# Patient Record
Sex: FEMALE | Race: WHITE | NOT HISPANIC OR LATINO | URBAN - METROPOLITAN AREA
[De-identification: names, ages, dates, MRNs, and addresses within clinical notes are randomized per-mention and may not be internally consistent; named-entity substitution may affect disease eponyms.]

---

## 2021-01-06 ENCOUNTER — OFFICE VISIT (OUTPATIENT)
Dept: DERMATOLOGY | Facility: CLINIC | Age: 76
End: 2021-01-06
Payer: MEDICARE

## 2021-01-06 DIAGNOSIS — L57.0 AK (ACTINIC KERATOSIS): ICD-10-CM

## 2021-01-06 DIAGNOSIS — D18.01 CHERRY ANGIOMA: ICD-10-CM

## 2021-01-06 DIAGNOSIS — Z12.83 SCREENING EXAM FOR SKIN CANCER: Primary | ICD-10-CM

## 2021-01-06 DIAGNOSIS — L82.1 SK (SEBORRHEIC KERATOSIS): ICD-10-CM

## 2021-01-06 DIAGNOSIS — L81.4 SOLAR LENTIGO: ICD-10-CM

## 2021-01-06 DIAGNOSIS — D22.9 MULTIPLE BENIGN NEVI: ICD-10-CM

## 2021-01-06 PROCEDURE — 99999 PR PBB SHADOW E&M-NEW PATIENT-LVL II: ICD-10-PCS | Mod: PBBFAC,,, | Performed by: PATHOLOGY

## 2021-01-06 PROCEDURE — 17000 DESTRUCT PREMALG LESION: CPT | Mod: PBBFAC | Performed by: PATHOLOGY

## 2021-01-06 PROCEDURE — 17000 DESTRUCT PREMALG LESION: CPT | Mod: S$PBB,,, | Performed by: PATHOLOGY

## 2021-01-06 PROCEDURE — 99202 OFFICE O/P NEW SF 15 MIN: CPT | Mod: 25,S$PBB,, | Performed by: PATHOLOGY

## 2021-01-06 PROCEDURE — 99202 PR OFFICE/OUTPT VISIT, NEW, LEVL II, 15-29 MIN: ICD-10-PCS | Mod: 25,S$PBB,, | Performed by: PATHOLOGY

## 2021-01-06 PROCEDURE — 17000 PR DESTRUCTION(LASER SURGERY,CRYOSURGERY,CHEMOSURGERY),PREMALIGNANT LESIONS,FIRST LESION: ICD-10-PCS | Mod: S$PBB,,, | Performed by: PATHOLOGY

## 2021-01-06 PROCEDURE — 99999 PR PBB SHADOW E&M-NEW PATIENT-LVL II: CPT | Mod: PBBFAC,,, | Performed by: PATHOLOGY

## 2021-01-06 PROCEDURE — 99202 OFFICE O/P NEW SF 15 MIN: CPT | Mod: PBBFAC,25 | Performed by: PATHOLOGY

## 2021-01-07 ENCOUNTER — IMMUNIZATION (OUTPATIENT)
Dept: OBSTETRICS AND GYNECOLOGY | Facility: CLINIC | Age: 76
End: 2021-01-07
Payer: MEDICARE

## 2021-01-07 DIAGNOSIS — Z23 NEED FOR VACCINATION: ICD-10-CM

## 2021-01-07 PROCEDURE — 91300 COVID-19, MRNA, LNP-S, PF, 30 MCG/0.3 ML DOSE VACCINE: CPT | Mod: PBBFAC

## 2021-01-28 ENCOUNTER — IMMUNIZATION (OUTPATIENT)
Dept: OBSTETRICS AND GYNECOLOGY | Facility: CLINIC | Age: 76
End: 2021-01-28
Payer: MEDICARE

## 2021-01-28 DIAGNOSIS — Z23 NEED FOR VACCINATION: Primary | ICD-10-CM

## 2021-01-28 PROCEDURE — 0002A COVID-19, MRNA, LNP-S, PF, 30 MCG/0.3 ML DOSE VACCINE: CPT | Mod: PBBFAC | Performed by: FAMILY MEDICINE

## 2021-01-28 PROCEDURE — 91300 COVID-19, MRNA, LNP-S, PF, 30 MCG/0.3 ML DOSE VACCINE: CPT | Mod: PBBFAC | Performed by: FAMILY MEDICINE

## 2021-09-28 ENCOUNTER — IMMUNIZATION (OUTPATIENT)
Dept: INTERNAL MEDICINE | Facility: CLINIC | Age: 76
End: 2021-09-28
Payer: MEDICARE

## 2021-09-28 DIAGNOSIS — Z23 NEED FOR VACCINATION: Primary | ICD-10-CM

## 2021-09-28 PROCEDURE — 0003A COVID-19, MRNA, LNP-S, PF, 30 MCG/0.3 ML DOSE VACCINE: CPT | Mod: PBBFAC

## 2021-09-28 PROCEDURE — 91300 COVID-19, MRNA, LNP-S, PF, 30 MCG/0.3 ML DOSE VACCINE: CPT | Mod: PBBFAC

## 2022-11-23 ENCOUNTER — OFFICE VISIT (OUTPATIENT)
Dept: URGENT CARE | Facility: CLINIC | Age: 77
End: 2022-11-23
Payer: MEDICARE

## 2022-11-23 VITALS
RESPIRATION RATE: 20 BRPM | TEMPERATURE: 98 F | DIASTOLIC BLOOD PRESSURE: 94 MMHG | SYSTOLIC BLOOD PRESSURE: 152 MMHG | OXYGEN SATURATION: 98 % | HEART RATE: 78 BPM

## 2022-11-23 DIAGNOSIS — S61.209A AVULSION OF SKIN OF FINGER, INITIAL ENCOUNTER: Primary | ICD-10-CM

## 2022-11-23 PROCEDURE — 12041 LACERATION REPAIR: ICD-10-PCS | Mod: S$GLB,,, | Performed by: NURSE PRACTITIONER

## 2022-11-23 PROCEDURE — 99203 OFFICE O/P NEW LOW 30 MIN: CPT | Mod: 25,S$GLB,, | Performed by: NURSE PRACTITIONER

## 2022-11-23 PROCEDURE — 90715 TDAP VACCINE GREATER THAN OR EQUAL TO 7YO IM: ICD-10-PCS | Mod: AT,S$GLB,, | Performed by: FAMILY MEDICINE

## 2022-11-23 PROCEDURE — 90471 IMMUNIZATION ADMIN: CPT | Mod: AT,S$GLB,, | Performed by: FAMILY MEDICINE

## 2022-11-23 PROCEDURE — 90715 TDAP VACCINE 7 YRS/> IM: CPT | Mod: AT,S$GLB,, | Performed by: FAMILY MEDICINE

## 2022-11-23 PROCEDURE — 99203 PR OFFICE/OUTPT VISIT, NEW, LEVL III, 30-44 MIN: ICD-10-PCS | Mod: 25,S$GLB,, | Performed by: NURSE PRACTITIONER

## 2022-11-23 PROCEDURE — 12041 INTMD RPR N-HF/GENIT 2.5CM/<: CPT | Mod: S$GLB,,, | Performed by: NURSE PRACTITIONER

## 2022-11-23 PROCEDURE — 90471 TDAP VACCINE GREATER THAN OR EQUAL TO 7YO IM: ICD-10-PCS | Mod: AT,S$GLB,, | Performed by: FAMILY MEDICINE

## 2022-11-23 RX ORDER — IRBESARTAN 300 MG/1
TABLET ORAL
COMMUNITY
Start: 2022-10-04 | End: 2023-01-04

## 2022-11-23 RX ORDER — NEOMYCIN SULFATE, POLYMYXIN B SULFATE AND DEXAMETHASONE 3.5; 10000; 1 MG/ML; [USP'U]/ML; MG/ML
1 SUSPENSION/ DROPS OPHTHALMIC 3 TIMES DAILY
COMMUNITY
Start: 2022-10-04 | End: 2023-01-04

## 2022-11-23 RX ORDER — CHLORTHALIDONE 25 MG/1
TABLET ORAL
COMMUNITY
Start: 2022-10-04 | End: 2023-01-04

## 2022-11-23 RX ORDER — PRAVASTATIN SODIUM 20 MG/1
TABLET ORAL
COMMUNITY
Start: 2022-10-04 | End: 2023-03-08

## 2022-11-23 RX ORDER — NIRMATRELVIR AND RITONAVIR 300-100 MG
KIT ORAL
COMMUNITY
Start: 2022-10-04 | End: 2023-01-04

## 2022-11-23 RX ORDER — PANTOPRAZOLE SODIUM 20 MG/1
TABLET, DELAYED RELEASE ORAL
COMMUNITY
Start: 2022-10-04 | End: 2023-01-04

## 2022-11-23 NOTE — PROGRESS NOTES
Subjective:       Patient ID: Maya Santos is a 77 y.o. female.    Vitals:  temperature is 98.2 °F (36.8 °C). Her blood pressure is 152/94 (abnormal) and her pulse is 78. Her respiration is 20 and oxygen saturation is 98%.     Chief Complaint: Laceration    Pt presents with complaint of laceration to left second digit.  Pt states incident occurred about 3 hours ago when she sliced her finger in the kitchen.  Pt states she is unsure when her last tetanus was.  Pt states she washed the area, sprayed with alcohol and applied neosporin to the area.  Provider note begins below    Last Tdap in 2015.  Patient was cutting squash with a Mandolin    Laceration   The incident occurred 1 to 3 hours ago. The laceration is located on the Right hand. The laceration is 2 cm in size. The laceration mechanism was a dirty knife. The pain is at a severity of 4/10. The pain is moderate. The pain has been Fluctuating since onset. She reports no foreign bodies present. Her tetanus status is out of date.     Constitution: Negative for fatigue and fever.   Cardiovascular:  Negative for chest pain and sob on exertion.   Respiratory:  Negative for cough and shortness of breath.    Gastrointestinal:  Negative for nausea, vomiting and constipation.   Skin:  Positive for laceration and erythema.     Objective:      Physical Exam   Constitutional: She is oriented to person, place, and time.   HENT:   Head: Normocephalic and atraumatic.   Cardiovascular: Normal rate.   Pulmonary/Chest: Effort normal. No respiratory distress.   Abdominal: Normal appearance.   Musculoskeletal:      Left hand: She exhibits laceration.   Neurological: She is alert and oriented to person, place, and time.   Skin: Skin is warm and dry. Lacerations - upper ext.:  left handerythema   Psychiatric: Her behavior is normal. Mood normal.             Assessment:       1. Avulsion of skin of finger, initial encounter          Plan:       Skin glue and pressure dressing  applied.  Leave pressure dressing in place for 2 days.  At which point may remove pressure dressing and leave open air.  Do not apply ointments.  May reapply pressure dressing if begins bleeding again        Avulsion of skin of finger, initial encounter

## 2022-11-23 NOTE — PROCEDURES
Laceration Repair    Date/Time: 2022 1:45 PM  Performed by: Elif Olivares NP  Authorized by: Elif Olivares NP   Consent Done: Yes  Consent: Verbal consent obtained.  Risks and benefits: risks, benefits and alternatives were discussed  Consent given by: patient  Patient identity confirmed: , name and verbally with patient  Body area: upper extremity  Location details: left index finger  Laceration length: 0.2 cm  Foreign bodies: no foreign bodies  Tendon involvement: none  Nerve involvement: none  Vascular damage: no    Patient sedated: no  Preparation: Patient was prepped and draped in the usual sterile fashion.  Irrigation solution: saline  Irrigation method: syringe  Amount of cleaning: standard  Debridement: minimal  Degree of undermining: none  Skin closure: glue  Dressing: non-stick sterile dressing and pressure/compression dressing  Patient tolerance: Patient tolerated the procedure well with no immediate complications

## 2022-11-23 NOTE — PROGRESS NOTES
Subjective:       Patient ID: Maya Santos is a 77 y.o. female.    Vitals:  temperature is 98.2 °F (36.8 °C). Her blood pressure is 152/94 (abnormal) and her pulse is 78. Her respiration is 20 and oxygen saturation is 98%.     Chief Complaint: Laceration    Pt presents with complaint of laceration to left second digit.  Pt states incident occurred about 3 hours ago when she sliced her finger in the kitchen.  Pt states she is unsure when her last tetanus was.  Pt states she washed the area, sprayed with alcohol and applied neosporin to the area.  Provider note begins below    Last Tdap in 2015.    Laceration   The incident occurred 1 to 3 hours ago. The laceration is located on the Right hand. The laceration is 2 cm in size. The laceration mechanism was a dirty knife. The pain is at a severity of 4/10. The pain is moderate. The pain has been Fluctuating since onset. She reports no foreign bodies present. Her tetanus status is out of date.     Constitution: Negative for fatigue and fever.   Cardiovascular:  Negative for chest pain and sob on exertion.   Respiratory:  Negative for cough and shortness of breath.    Gastrointestinal:  Negative for nausea, vomiting and constipation.   Skin:  Positive for laceration and erythema.     Objective:      Physical Exam   Constitutional: She is oriented to person, place, and time.   HENT:   Head: Normocephalic and atraumatic.   Cardiovascular: Normal rate.   Pulmonary/Chest: Effort normal. No respiratory distress.   Abdominal: Normal appearance.   Musculoskeletal:      Left hand: She exhibits laceration.   Neurological: She is alert and oriented to person, place, and time.   Skin: Skin is warm and dry. Lacerations - upper ext.:  left handerythema   Psychiatric: Her behavior is normal. Mood normal.             Assessment:       1. Avulsion of skin of finger, initial encounter          Plan:       Skin glue and pressure dressing applied.  Leave pressure dressing in place for 2  days.  At which point may remove pressure dressing and leave open air.  Do not apply ointments.  May reapply pressure dressing if begins bleeding again        Avulsion of skin of finger, initial encounter

## 2022-11-29 RX ORDER — CEPHALEXIN 500 MG/1
500 CAPSULE ORAL EVERY 8 HOURS
Qty: 21 CAPSULE | Refills: 0 | Status: SHIPPED | OUTPATIENT
Start: 2022-11-29 | End: 2023-01-04

## 2023-01-04 ENCOUNTER — CLINICAL SUPPORT (OUTPATIENT)
Dept: INTERNAL MEDICINE | Facility: CLINIC | Age: 78
End: 2023-01-04
Payer: MEDICARE

## 2023-01-04 ENCOUNTER — OFFICE VISIT (OUTPATIENT)
Dept: INTERNAL MEDICINE | Facility: CLINIC | Age: 78
End: 2023-01-04
Payer: MEDICARE

## 2023-01-04 VITALS
BODY MASS INDEX: 32.42 KG/M2 | SYSTOLIC BLOOD PRESSURE: 120 MMHG | WEIGHT: 201.75 LBS | HEIGHT: 66 IN | HEART RATE: 58 BPM | DIASTOLIC BLOOD PRESSURE: 80 MMHG

## 2023-01-04 DIAGNOSIS — R73.03 PREDIABETES: ICD-10-CM

## 2023-01-04 DIAGNOSIS — I10 BENIGN ESSENTIAL HYPERTENSION: ICD-10-CM

## 2023-01-04 DIAGNOSIS — Z78.0 ASYMPTOMATIC MENOPAUSE: ICD-10-CM

## 2023-01-04 DIAGNOSIS — R79.9 ABNORMAL FINDING OF BLOOD CHEMISTRY, UNSPECIFIED: ICD-10-CM

## 2023-01-04 DIAGNOSIS — I44.7 LBBB (LEFT BUNDLE BRANCH BLOCK): ICD-10-CM

## 2023-01-04 DIAGNOSIS — Z00.00 ANNUAL PHYSICAL EXAM: Primary | ICD-10-CM

## 2023-01-04 DIAGNOSIS — Z12.31 SCREENING MAMMOGRAM FOR BREAST CANCER: ICD-10-CM

## 2023-01-04 DIAGNOSIS — E78.5 DYSLIPIDEMIA: ICD-10-CM

## 2023-01-04 DIAGNOSIS — E04.2 MULTINODULAR GOITER: Primary | ICD-10-CM

## 2023-01-04 PROBLEM — I34.0 MITRAL VALVE INSUFFICIENCY: Status: ACTIVE | Noted: 2023-01-04

## 2023-01-04 PROBLEM — M85.80 SENILE OSTEOPENIA: Status: ACTIVE | Noted: 2022-09-27

## 2023-01-04 PROBLEM — E55.9 VITAMIN D DEFICIENCY: Status: ACTIVE | Noted: 2022-09-27

## 2023-01-04 PROBLEM — R01.1 HEART MURMUR: Status: ACTIVE | Noted: 2022-09-27

## 2023-01-04 PROBLEM — E78.00 PRIMARY HYPERCHOLESTEROLEMIA: Status: ACTIVE | Noted: 2022-05-11

## 2023-01-04 LAB
ALBUMIN SERPL BCP-MCNC: 4.2 G/DL (ref 3.5–5.2)
ALP SERPL-CCNC: 74 U/L (ref 55–135)
ALT SERPL W/O P-5'-P-CCNC: 25 U/L (ref 10–44)
ANION GAP SERPL CALC-SCNC: 9 MMOL/L (ref 8–16)
AST SERPL-CCNC: 24 U/L (ref 10–40)
BILIRUB SERPL-MCNC: 1 MG/DL (ref 0.1–1)
BILIRUB UR QL STRIP: NEGATIVE
BUN SERPL-MCNC: 21 MG/DL (ref 8–23)
CALCIUM SERPL-MCNC: 9.5 MG/DL (ref 8.7–10.5)
CHLORIDE SERPL-SCNC: 106 MMOL/L (ref 95–110)
CHOLEST SERPL-MCNC: 179 MG/DL (ref 120–199)
CHOLEST/HDLC SERPL: 4.8 {RATIO} (ref 2–5)
CLARITY UR REFRACT.AUTO: CLEAR
CO2 SERPL-SCNC: 28 MMOL/L (ref 23–29)
COLOR UR AUTO: YELLOW
CREAT SERPL-MCNC: 0.9 MG/DL (ref 0.5–1.4)
ERYTHROCYTE [DISTWIDTH] IN BLOOD BY AUTOMATED COUNT: 12.7 % (ref 11.5–14.5)
EST. GFR  (NO RACE VARIABLE): >60 ML/MIN/1.73 M^2
ESTIMATED AVG GLUCOSE: 108 MG/DL (ref 68–131)
GLUCOSE SERPL-MCNC: 90 MG/DL (ref 70–110)
GLUCOSE UR QL STRIP: NEGATIVE
HBA1C MFR BLD: 5.4 % (ref 4–5.6)
HCT VFR BLD AUTO: 42.8 % (ref 37–48.5)
HCV AB SERPL QL IA: NORMAL
HDLC SERPL-MCNC: 37 MG/DL (ref 40–75)
HDLC SERPL: 20.7 % (ref 20–50)
HGB BLD-MCNC: 13.9 G/DL (ref 12–16)
HGB UR QL STRIP: NEGATIVE
KETONES UR QL STRIP: NEGATIVE
LDLC SERPL CALC-MCNC: 122.4 MG/DL (ref 63–159)
LEUKOCYTE ESTERASE UR QL STRIP: NEGATIVE
MCH RBC QN AUTO: 29.5 PG (ref 27–31)
MCHC RBC AUTO-ENTMCNC: 32.5 G/DL (ref 32–36)
MCV RBC AUTO: 91 FL (ref 82–98)
MICROSCOPIC COMMENT: NORMAL
NITRITE UR QL STRIP: NEGATIVE
NONHDLC SERPL-MCNC: 142 MG/DL
PH UR STRIP: 7 [PH] (ref 5–8)
PLATELET # BLD AUTO: 255 K/UL (ref 150–450)
PMV BLD AUTO: 11.1 FL (ref 9.2–12.9)
POTASSIUM SERPL-SCNC: 4.3 MMOL/L (ref 3.5–5.1)
PROT SERPL-MCNC: 7.4 G/DL (ref 6–8.4)
PROT UR QL STRIP: NEGATIVE
RBC # BLD AUTO: 4.71 M/UL (ref 4–5.4)
SODIUM SERPL-SCNC: 143 MMOL/L (ref 136–145)
SP GR UR STRIP: 1.02 (ref 1–1.03)
TRIGL SERPL-MCNC: 98 MG/DL (ref 30–150)
TSH SERPL DL<=0.005 MIU/L-ACNC: 2.68 UIU/ML (ref 0.4–4)
URN SPEC COLLECT METH UR: NORMAL
WBC # BLD AUTO: 7.43 K/UL (ref 3.9–12.7)

## 2023-01-04 PROCEDURE — 99213 OFFICE O/P EST LOW 20 MIN: CPT | Mod: PBBFAC | Performed by: INTERNAL MEDICINE

## 2023-01-04 PROCEDURE — 90750 HZV VACC RECOMBINANT IM: CPT | Mod: PBBFAC

## 2023-01-04 PROCEDURE — 80053 COMPREHEN METABOLIC PANEL: CPT | Performed by: INTERNAL MEDICINE

## 2023-01-04 PROCEDURE — 90471 IMMUNIZATION ADMIN: CPT | Mod: PBBFAC

## 2023-01-04 PROCEDURE — 81001 URINALYSIS AUTO W/SCOPE: CPT | Performed by: INTERNAL MEDICINE

## 2023-01-04 PROCEDURE — 86803 HEPATITIS C AB TEST: CPT | Performed by: INTERNAL MEDICINE

## 2023-01-04 PROCEDURE — 99999 PR PBB SHADOW E&M-EST. PATIENT-LVL III: ICD-10-PCS | Mod: PBBFAC,,, | Performed by: INTERNAL MEDICINE

## 2023-01-04 PROCEDURE — 84443 ASSAY THYROID STIM HORMONE: CPT | Performed by: INTERNAL MEDICINE

## 2023-01-04 PROCEDURE — 85027 COMPLETE CBC AUTOMATED: CPT | Performed by: INTERNAL MEDICINE

## 2023-01-04 PROCEDURE — 99214 PR OFFICE/OUTPT VISIT, EST, LEVL IV, 30-39 MIN: ICD-10-PCS | Mod: S$PBB,,, | Performed by: INTERNAL MEDICINE

## 2023-01-04 PROCEDURE — 99214 OFFICE O/P EST MOD 30 MIN: CPT | Mod: S$PBB,,, | Performed by: INTERNAL MEDICINE

## 2023-01-04 PROCEDURE — 83036 HEMOGLOBIN GLYCOSYLATED A1C: CPT | Performed by: INTERNAL MEDICINE

## 2023-01-04 PROCEDURE — 80061 LIPID PANEL: CPT | Performed by: INTERNAL MEDICINE

## 2023-01-04 PROCEDURE — 99999 PR PBB SHADOW E&M-EST. PATIENT-LVL III: CPT | Mod: PBBFAC,,, | Performed by: INTERNAL MEDICINE

## 2023-01-04 RX ORDER — SEMAGLUTIDE 1.34 MG/ML
0.25 INJECTION, SOLUTION SUBCUTANEOUS
Qty: 1 PEN | Refills: 5 | Status: SHIPPED | OUTPATIENT
Start: 2023-01-04 | End: 2023-01-04 | Stop reason: SDUPTHER

## 2023-01-04 RX ORDER — AMOXICILLIN 500 MG
CAPSULE ORAL DAILY
COMMUNITY

## 2023-01-04 RX ORDER — IRBESARTAN AND HYDROCHLOROTHIAZIDE 150; 12.5 MG/1; MG/1
1 TABLET, FILM COATED ORAL DAILY
Qty: 90 TABLET | Refills: 3 | Status: SHIPPED | OUTPATIENT
Start: 2023-01-04 | End: 2023-12-31

## 2023-01-04 RX ORDER — SEMAGLUTIDE 1.34 MG/ML
0.25 INJECTION, SOLUTION SUBCUTANEOUS
Qty: 1 PEN | Refills: 5 | Status: SHIPPED | OUTPATIENT
Start: 2023-01-04 | End: 2023-03-31

## 2023-01-08 NOTE — PROGRESS NOTES
"Subjective:       Patient ID: Maya Santos is a 77 y.o. female.    Chief Complaint: Establish [facility]    77-year-old nonsmoking female here to establish with [employer] [facility].  She has a past medical history significant for prediabetes with her last hemoglobin A1c in August being 5.8%.  She also has a history of essential hypertension, osteoarthritis of the left knee with a Baker's cyst in the past as well as osteopenia and thyroid nodules.  She also has chronic venous insufficiency and by looking back in her medical record does have a left bundle branch block and was following with cardiology for a "murmur"  Her biggest goal today is to lose the weight she gained during the COVID pandemic.  She eats very healthy and exercises with a  twice a week for at least an hour.    Review of Systems   Constitutional:  Positive for unexpected weight change. Negative for activity change, appetite change, chills, diaphoresis, fatigue and fever.   HENT:  Negative for nasal congestion, ear pain, mouth sores, postnasal drip, sinus pressure/congestion, sore throat and trouble swallowing.    Eyes:  Negative for pain, redness and visual disturbance.   Respiratory:  Negative for apnea, cough, chest tightness, shortness of breath and wheezing.    Cardiovascular:  Negative for chest pain, palpitations and leg swelling.   Gastrointestinal:  Negative for abdominal distention, abdominal pain, blood in stool, constipation, diarrhea, nausea and vomiting.   Endocrine: Negative for cold intolerance, polydipsia, polyphagia and polyuria.   Genitourinary:  Negative for difficulty urinating, dysuria, flank pain, frequency, hematuria, menstrual problem, pelvic pain and urgency.   Musculoskeletal:  Positive for arthralgias. Negative for back pain, joint swelling and neck pain.        Left knee   Integumentary:  Negative for color change, rash and wound.   Neurological:  Negative for dizziness, tremors, seizures, " syncope, weakness, light-headedness, numbness and headaches.   Hematological:  Negative for adenopathy. Does not bruise/bleed easily.   Psychiatric/Behavioral:  Negative for confusion, decreased concentration, dysphoric mood, hallucinations, self-injury, sleep disturbance and suicidal ideas. The patient is not nervous/anxious.        Objective:      Physical Exam  Constitutional:       Appearance: Normal appearance.   Eyes:      General: No scleral icterus.  Neck:      Vascular: No carotid bruit.   Cardiovascular:      Rate and Rhythm: Normal rate and regular rhythm.      Heart sounds: No murmur heard.  Pulmonary:      Effort: Pulmonary effort is normal.      Breath sounds: Normal breath sounds. No rales.   Abdominal:      General: Bowel sounds are normal. There is no distension.      Palpations: Abdomen is soft.      Tenderness: There is no abdominal tenderness.   Musculoskeletal:         General: No tenderness.      Cervical back: Normal range of motion and neck supple.   Neurological:      Mental Status: She is alert and oriented to person, place, and time.   Psychiatric:         Mood and Affect: Mood normal.         Behavior: Behavior normal.         Thought Content: Thought content normal.         Judgment: Judgment normal.       Assessment/plan       Multinodular goiter  -     US Soft Tissue Head Neck Thyroid; Future    Prediabetes  -     semaglutide (OZEMPIC) 0.25 mg or 0.5 mg(2 mg/1.5 mL) pen injector; Inject 0.25 mg into the skin every 7 days.  Dispense: 1 pen; Refill: 5    Dyslipidemia   LDL at 122 on low dose pravastatin - will likely increase on follow up     Benign essential hypertension   Change as needed [place] for fluid to daily HCTZ 12.5 within your angiotensin receptor blocker.  -     irbesartan-hydrochlorothiazide (AVALIDE) 150-12.5 mg per tablet; Take 1 tablet by mouth once daily.  Dispense: 90 tablet; Refill: 3    LBBB (left bundle branch block)- mitral valve regurg  Comments:  will get  records from Dr Mera     Asymptomatic menopause- with osteopenia on last BMD  -     DXA Bone Density Spine And Hip; Future    Screening mammogram for breast cancer  -     Mammo Digital Screening Bilat; Future; Expected date: 01/04/2024    Other orders  -     (In Office Administered) Zoster Recombinant Vaccine     Colonoscopy record

## 2023-02-06 ENCOUNTER — HOSPITAL ENCOUNTER (OUTPATIENT)
Dept: RADIOLOGY | Facility: OTHER | Age: 78
Discharge: HOME | End: 2023-02-06
Attending: INTERNAL MEDICINE
Payer: MEDICARE

## 2023-02-06 DIAGNOSIS — E04.2 MULTINODULAR GOITER: ICD-10-CM

## 2023-02-06 DIAGNOSIS — Z78.0 ASYMPTOMATIC MENOPAUSE: ICD-10-CM

## 2023-02-06 PROCEDURE — 76536 US EXAM OF HEAD AND NECK: CPT | Mod: TC

## 2023-02-06 PROCEDURE — 76536 US SOFT TISSUE HEAD NECK THYROID: ICD-10-PCS | Mod: 26,,, | Performed by: RADIOLOGY

## 2023-02-06 PROCEDURE — 77080 DEXA BONE DENSITY SPINE HIP: ICD-10-PCS | Mod: 26,,, | Performed by: RADIOLOGY

## 2023-02-06 PROCEDURE — 77080 DXA BONE DENSITY AXIAL: CPT | Mod: TC

## 2023-02-06 PROCEDURE — 77080 DXA BONE DENSITY AXIAL: CPT | Mod: 26,,, | Performed by: RADIOLOGY

## 2023-02-06 PROCEDURE — 76536 US EXAM OF HEAD AND NECK: CPT | Mod: 26,,, | Performed by: RADIOLOGY

## 2023-02-15 ENCOUNTER — HOSPITAL ENCOUNTER (OUTPATIENT)
Dept: RADIOLOGY | Facility: OTHER | Age: 78
Discharge: HOME | End: 2023-02-15
Attending: INTERNAL MEDICINE
Payer: MEDICARE

## 2023-02-15 DIAGNOSIS — Z12.31 SCREENING MAMMOGRAM FOR BREAST CANCER: ICD-10-CM

## 2023-02-15 PROCEDURE — 77063 MAMMO DIGITAL SCREENING BILAT WITH TOMO: ICD-10-PCS | Mod: 26,,, | Performed by: RADIOLOGY

## 2023-02-15 PROCEDURE — 77067 SCR MAMMO BI INCL CAD: CPT | Mod: 26,,, | Performed by: RADIOLOGY

## 2023-02-15 PROCEDURE — 77067 SCR MAMMO BI INCL CAD: CPT | Mod: TC

## 2023-02-15 PROCEDURE — 77067 MAMMO DIGITAL SCREENING BILAT WITH TOMO: ICD-10-PCS | Mod: 26,,, | Performed by: RADIOLOGY

## 2023-02-15 PROCEDURE — 77063 BREAST TOMOSYNTHESIS BI: CPT | Mod: 26,,, | Performed by: RADIOLOGY

## 2023-03-08 ENCOUNTER — OFFICE VISIT (OUTPATIENT)
Dept: INTERNAL MEDICINE | Facility: CLINIC | Age: 78
End: 2023-03-08
Payer: MEDICARE

## 2023-03-08 VITALS
WEIGHT: 201.25 LBS | HEART RATE: 76 BPM | BODY MASS INDEX: 32.49 KG/M2 | SYSTOLIC BLOOD PRESSURE: 126 MMHG | DIASTOLIC BLOOD PRESSURE: 85 MMHG

## 2023-03-08 DIAGNOSIS — E78.5 DYSLIPIDEMIA: Primary | ICD-10-CM

## 2023-03-08 DIAGNOSIS — Z23 NEED FOR SHINGLES VACCINE: ICD-10-CM

## 2023-03-08 DIAGNOSIS — E04.2 MULTIPLE THYROID NODULES: ICD-10-CM

## 2023-03-08 DIAGNOSIS — I10 BENIGN ESSENTIAL HYPERTENSION: ICD-10-CM

## 2023-03-08 DIAGNOSIS — M85.852 OSTEOPENIA OF NECKS OF BOTH FEMURS: ICD-10-CM

## 2023-03-08 DIAGNOSIS — M85.851 OSTEOPENIA OF NECKS OF BOTH FEMURS: ICD-10-CM

## 2023-03-08 PROCEDURE — 99999 PR PBB SHADOW E&M-EST. PATIENT-LVL II: CPT | Mod: PBBFAC,,, | Performed by: INTERNAL MEDICINE

## 2023-03-08 PROCEDURE — 99214 PR OFFICE/OUTPT VISIT, EST, LEVL IV, 30-39 MIN: ICD-10-PCS | Mod: S$PBB,,, | Performed by: INTERNAL MEDICINE

## 2023-03-08 PROCEDURE — 99212 OFFICE O/P EST SF 10 MIN: CPT | Mod: PBBFAC | Performed by: INTERNAL MEDICINE

## 2023-03-08 PROCEDURE — 99999 PR PBB SHADOW E&M-EST. PATIENT-LVL II: ICD-10-PCS | Mod: PBBFAC,,, | Performed by: INTERNAL MEDICINE

## 2023-03-08 PROCEDURE — 99214 OFFICE O/P EST MOD 30 MIN: CPT | Mod: S$PBB,,, | Performed by: INTERNAL MEDICINE

## 2023-03-08 RX ORDER — ROSUVASTATIN CALCIUM 20 MG/1
20 TABLET, COATED ORAL DAILY
Qty: 90 TABLET | Refills: 1 | Status: SHIPPED | OUTPATIENT
Start: 2023-03-08 | End: 2023-08-31

## 2023-03-11 ENCOUNTER — TELEPHONE (OUTPATIENT)
Dept: INTERNAL MEDICINE | Facility: CLINIC | Age: 78
End: 2023-03-11
Payer: MEDICARE

## 2023-03-11 NOTE — PROGRESS NOTES
Subjective:       Patient ID: Maya Santos is a 77 y.o. female.    Chief Complaint: Follow-up    77-year-old nonsmoking female here for follow-up after her initial visit in January for test being done to include a repeat thyroid ultrasound as well as a bone mineral density.  She also performed a mammogram that was within normal limits.  Her bone density showed some osteopenia at the femoral neck as below.  She also did not yet started semaglutide but will today.  Of utmost importance is getting the size of her last thyroid nodule in the mid lobe on the right to see if she needs another fine-needle aspiration and consultation with Endocrine.  The last thyroid ultrasound was done in Dr. Crow's office in endocrine.  The last FNA was many years ago.    Review of Systems   Constitutional:  Negative for activity change, appetite change and unexpected weight change.   HENT:  Negative for ear pain, mouth sores, postnasal drip, sinus pressure/congestion and trouble swallowing.    Eyes:  Negative for pain, redness and visual disturbance.   Respiratory:  Negative for apnea, chest tightness, shortness of breath and wheezing.    Cardiovascular:  Negative for palpitations and leg swelling.   Gastrointestinal:  Negative for abdominal distention, blood in stool, constipation and diarrhea.   Endocrine: Negative for cold intolerance, polydipsia, polyphagia and polyuria.   Genitourinary:  Negative for difficulty urinating, dysuria, flank pain, frequency, hematuria, menstrual problem, pelvic pain and urgency.   Musculoskeletal:  Negative for back pain.   Integumentary:  Negative for color change and wound.   Neurological:  Negative for dizziness, tremors, seizures, syncope and light-headedness.   Hematological:  Negative for adenopathy. Does not bruise/bleed easily.   Psychiatric/Behavioral:  Negative for confusion, decreased concentration, dysphoric mood, hallucinations, self-injury, sleep disturbance and suicidal ideas. The  patient is not nervous/anxious.        Objective:      Wt Readings from Last 3 Encounters:   03/08/23 91.3 kg (201 lb 4.5 oz)   01/04/23 91.5 kg (201 lb 11.5 oz)     Temp Readings from Last 3 Encounters:   11/23/22 98.2 °F (36.8 °C)     BP Readings from Last 3 Encounters:   03/08/23 126/85   01/04/23 120/80   11/23/22 (!) 152/94     Pulse Readings from Last 3 Encounters:   03/08/23 76   01/04/23 (!) 58   11/23/22 78     Physical Exam  Eyes:      General: No scleral icterus.  Cardiovascular:      Rate and Rhythm: Normal rate and regular rhythm.      Heart sounds: No murmur heard.  Pulmonary:      Effort: Pulmonary effort is normal.      Breath sounds: Normal breath sounds. No rales.   Abdominal:      Palpations: Abdomen is soft.   Musculoskeletal:         General: No tenderness.      Cervical back: Normal range of motion and neck supple.   Neurological:      Mental Status: She is alert and oriented to person, place, and time.   Psychiatric:         Mood and Affect: Mood normal.         Behavior: Behavior normal.         Thought Content: Thought content normal.         Judgment: Judgment normal.       Lab Results   Component Value Date    WBC 7.43 01/04/2023    HGB 13.9 01/04/2023    HCT 42.8 01/04/2023     01/04/2023    CHOL 179 01/04/2023    TRIG 98 01/04/2023    HDL 37 (L) 01/04/2023    ALT 25 01/04/2023    AST 24 01/04/2023     01/04/2023    K 4.3 01/04/2023     01/04/2023    CREATININE 0.9 01/04/2023    BUN 21 01/04/2023    CO2 28 01/04/2023    TSH 2.677 01/04/2023    HGBA1C 5.4 01/04/2023     Assessment/plan       Dyslipidemia   Change pravachol 20 mg to rosuvastatin 20 mg for better cardiovascular protection   -     rosuvastatin (CRESTOR) 20 MG tablet; Take 1 tablet (20 mg total) by mouth once daily.  Dispense: 90 tablet; Refill: 1    Osteopenia of necks of both femurs- right femoral neck - T score of  -1.8 (2/2023)  Comments:  continue with adequate Vitamin D and calcium as well as weight  bearing exercise; recheck 2 years    Multiple thyroid nodules   NEED THYROID ULTRASOUND FROM DR GARCIA LAST DONE IN HIS OFFICE     RIGHT LOBE NODULE (2.9 CM )  MAY NEED REPEAT FNA LAST DONE YEARS AGO AND NEGATIVE    IN DR MCGEE records - [phone]    Benign essential hypertension  Comments:  continue with Avalide at present dose     Need for shingles vaccine  Comments:  first in 1/2023    Insulin resistance    To start semaglutide today at 0.25 mg weekly

## 2023-03-11 NOTE — TELEPHONE ENCOUNTER
CH pt signed another consent on Wednesday for :       NEED THYROID ULTRASOUND FROM DR GARCIA LAST DONE IN HIS OFFICE     RIGHT LOBE NODULE (2.9 CM )  MAY NEED REPEAT FNA LAST DONE YEARS AGO AND NEGATIVE      IN DR MCGEE RECORDS - [phone]    If we cannot get this these records she will need to see endocrine here for repeat bx

## 2023-03-30 ENCOUNTER — OFFICE VISIT (OUTPATIENT)
Dept: CARDIOLOGY | Facility: CLINIC | Age: 78
End: 2023-03-30
Payer: MEDICARE

## 2023-03-30 VITALS
WEIGHT: 199.31 LBS | DIASTOLIC BLOOD PRESSURE: 88 MMHG | SYSTOLIC BLOOD PRESSURE: 153 MMHG | BODY MASS INDEX: 32.03 KG/M2 | HEIGHT: 66 IN | HEART RATE: 73 BPM

## 2023-03-30 DIAGNOSIS — I51.7 LEFT ATRIAL ENLARGEMENT: ICD-10-CM

## 2023-03-30 DIAGNOSIS — I10 BENIGN ESSENTIAL HYPERTENSION: Primary | ICD-10-CM

## 2023-03-30 DIAGNOSIS — E78.00 PRIMARY HYPERCHOLESTEROLEMIA: ICD-10-CM

## 2023-03-30 DIAGNOSIS — I34.0 NONRHEUMATIC MITRAL VALVE REGURGITATION: ICD-10-CM

## 2023-03-30 PROBLEM — I44.7 LEFT BUNDLE BRANCH BLOCK (LBBB): Status: RESOLVED | Noted: 2022-05-11 | Resolved: 2023-03-30

## 2023-03-30 PROCEDURE — 99999 PR PBB SHADOW E&M-EST. PATIENT-LVL III: CPT | Mod: PBBFAC,,, | Performed by: INTERNAL MEDICINE

## 2023-03-30 PROCEDURE — 99204 OFFICE O/P NEW MOD 45 MIN: CPT | Mod: S$PBB,,, | Performed by: INTERNAL MEDICINE

## 2023-03-30 PROCEDURE — 99213 OFFICE O/P EST LOW 20 MIN: CPT | Mod: PBBFAC | Performed by: INTERNAL MEDICINE

## 2023-03-30 PROCEDURE — 99999 PR PBB SHADOW E&M-EST. PATIENT-LVL III: ICD-10-PCS | Mod: PBBFAC,,, | Performed by: INTERNAL MEDICINE

## 2023-03-30 PROCEDURE — 99204 PR OFFICE/OUTPT VISIT, NEW, LEVL IV, 45-59 MIN: ICD-10-PCS | Mod: S$PBB,,, | Performed by: INTERNAL MEDICINE

## 2023-03-30 NOTE — PROGRESS NOTES
Subjective:   Patient ID:  Maya Santos is a 77 y.o. female who presents for evaluation of No chief complaint on file.      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  She is doing well with no new symptoms or cardiovascular complaints and no change in exercise capacity.  She denies chest discomfort, ULLOA, palpitations, PND/orthopnea, lightheadedness and syncope.    Pressures at home are fine.  She was rushing today.    Past Medical History:   Diagnosis Date    Arthritis     left knee- hyaluronic acid    GERD (gastroesophageal reflux disease)     Hyperlipidemia     Hypertension     S/P thyroid biopsy     Venous insufficiency     Wrist fracture, closed, right, sequela 2019       Past Surgical History:   Procedure Laterality Date    ADENOIDECTOMY       SECTION      two            Family History   Problem Relation Age of Onset    Hypertension Mother     Arthritis Mother     Stroke Father         bleeding stroke - passed    Arthritis Father     Arthritis Brother        Current Outpatient Medications   Medication Sig    irbesartan-hydrochlorothiazide (AVALIDE) 150-12.5 mg per tablet Take 1 tablet by mouth once daily.    omega-3 fatty acids/fish oil (FISH OIL-OMEGA-3 FATTY ACIDS) 300-1,000 mg capsule Take by mouth once daily.    rosuvastatin (CRESTOR) 20 MG tablet Take 1 tablet (20 mg total) by mouth once daily.    semaglutide (OZEMPIC) 0.25 mg or 0.5 mg(2 mg/1.5 mL) pen injector Inject 0.25 mg into the skin every 7 days. (Patient not taking: Reported on 3/8/2023)    UNABLE TO FIND Circulation & Vein Support    UNABLE TO FIND Essential One Multi vit    UNABLE TO FIND medication name:marine collagen    UNABLE TO FIND Lauren Saucedo     No current facility-administered medications for this visit.       Review of patient's allergies indicates:  No Known Allergies    ROS  The review of systems is negative except as above.  Objective:   Physical Exam  Vitals reviewed.   Constitutional:        Appearance: She is well-developed.   HENT:      Head: Normocephalic and atraumatic.   Eyes:      General: No scleral icterus.     Conjunctiva/sclera: Conjunctivae normal.   Neck:      Vascular: No JVD.   Cardiovascular:      Rate and Rhythm: Normal rate and regular rhythm.      Pulses: Intact distal pulses.      Heart sounds: Normal heart sounds. No murmur heard.    No friction rub. No gallop.   Pulmonary:      Effort: Pulmonary effort is normal.      Breath sounds: Normal breath sounds. No wheezing or rales.   Abdominal:      General: Bowel sounds are normal. There is no distension.      Palpations: Abdomen is soft.      Tenderness: There is no abdominal tenderness.   Musculoskeletal:         General: Normal range of motion.      Cervical back: Normal range of motion and neck supple.   Skin:     General: Skin is warm and dry.      Findings: No erythema or rash.   Neurological:      Mental Status: She is alert and oriented to person, place, and time.   Psychiatric:         Behavior: Behavior normal.         Thought Content: Thought content normal.         Judgment: Judgment normal.     Lab Results   Component Value Date    WBC 7.43 01/04/2023    HGB 13.9 01/04/2023    HCT 42.8 01/04/2023    MCV 91 01/04/2023     01/04/2023         Chemistry        Component Value Date/Time     01/04/2023 1043    K 4.3 01/04/2023 1043     01/04/2023 1043    CO2 28 01/04/2023 1043    BUN 21 01/04/2023 1043    CREATININE 0.9 01/04/2023 1043    GLU 90 01/04/2023 1043        Component Value Date/Time    CALCIUM 9.5 01/04/2023 1043    ALKPHOS 74 01/04/2023 1043    AST 24 01/04/2023 1043    ALT 25 01/04/2023 1043    BILITOT 1.0 01/04/2023 1043            Lab Results   Component Value Date    CHOL 179 01/04/2023    CHOL 173 08/04/2022    CHOL 179 05/25/2022     Lab Results   Component Value Date    HDL 37 (L) 01/04/2023    HDL 37 (L) 08/04/2022    HDL 35 (L) 05/25/2022     Lab Results   Component Value Date     LDLCALC 122.4 01/04/2023    LDLCALC 121 08/04/2022    LDLCALC 127 (H) 05/25/2022     Lab Results   Component Value Date    TRIG 98 01/04/2023    TRIG 96 08/04/2022    TRIG 109 05/25/2022     Lab Results   Component Value Date    CHOLHDL 20.7 01/04/2023    CHOLHDL 4.68 08/04/2022    CHOLHDL 5.11 05/25/2022       Lab Results   Component Value Date    TSH 2.677 01/04/2023       Lab Results   Component Value Date    HGBA1C 5.4 01/04/2023         Assessment:     1. Benign essential hypertension    2. Left bundle branch block (LBBB)    3. Nonrheumatic mitral valve regurgitation    4. Primary hypercholesterolemia    5. Left atrial enlargement        Plan:     Continue current medicines.    Diet/exercise goals reinforced.    Hand washing and social distancing stressed.    F/U 12 months

## 2023-03-31 NOTE — TELEPHONE ENCOUNTER
No new care gaps identified.  [facility] Embedded Care Gaps. Reference number: [id number]. 3/31/2023   1:24:39 PM CDT

## 2023-04-26 ENCOUNTER — DOCUMENTATION ONLY (OUTPATIENT)
Dept: INTERNAL MEDICINE | Facility: CLINIC | Age: 78
End: 2023-04-26
Payer: MEDICARE

## 2023-05-01 ENCOUNTER — TELEPHONE (OUTPATIENT)
Dept: INTERNAL MEDICINE | Facility: CLINIC | Age: 78
End: 2023-05-01
Payer: MEDICARE

## 2023-05-01 DIAGNOSIS — E04.1 RIGHT THYROID NODULE: Primary | ICD-10-CM

## 2023-05-01 NOTE — TELEPHONE ENCOUNTER
CH pt had bx of a right lower lobe thyroid nodule in 2012 that was essentially indeterminate had some blood had some thyroid tissue some follicular cells but no cancer    However ultrasound done in February shows a nodule which was in 2012 1.3 cm is now 2.9 cm which is more than doubling so I would like her to see the endocrinologist that deals with thyroid and have them do a consultation    Endocrine referral in - please arrange

## 2023-05-05 ENCOUNTER — OFFICE VISIT (OUTPATIENT)
Dept: INTERNAL MEDICINE | Facility: CLINIC | Age: 78
End: 2023-05-05
Payer: MEDICARE

## 2023-05-05 ENCOUNTER — CLINICAL SUPPORT (OUTPATIENT)
Dept: INTERNAL MEDICINE | Facility: CLINIC | Age: 78
End: 2023-05-05
Payer: MEDICARE

## 2023-05-05 ENCOUNTER — TELEPHONE (OUTPATIENT)
Dept: ENDOSCOPY | Facility: HOSPITAL | Age: 78
End: 2023-05-05
Payer: MEDICARE

## 2023-05-05 ENCOUNTER — DOCUMENTATION ONLY (OUTPATIENT)
Dept: INTERNAL MEDICINE | Facility: CLINIC | Age: 78
End: 2023-05-05

## 2023-05-05 ENCOUNTER — HOSPITAL ENCOUNTER (OUTPATIENT)
Dept: RADIOLOGY | Facility: HOSPITAL | Age: 78
Discharge: HOME | End: 2023-05-05
Attending: INTERNAL MEDICINE
Payer: MEDICARE

## 2023-05-05 VITALS
HEART RATE: 60 BPM | OXYGEN SATURATION: 97 % | TEMPERATURE: 98 F | WEIGHT: 198.06 LBS | SYSTOLIC BLOOD PRESSURE: 138 MMHG | DIASTOLIC BLOOD PRESSURE: 88 MMHG | BODY MASS INDEX: 31.97 KG/M2

## 2023-05-05 VITALS — BODY MASS INDEX: 31.82 KG/M2 | WEIGHT: 198 LBS | HEIGHT: 66 IN

## 2023-05-05 DIAGNOSIS — R73.03 PREDIABETES: ICD-10-CM

## 2023-05-05 DIAGNOSIS — E55.9 VITAMIN D DEFICIENCY: ICD-10-CM

## 2023-05-05 DIAGNOSIS — R10.13 EPIGASTRIC ABDOMINAL PAIN: ICD-10-CM

## 2023-05-05 DIAGNOSIS — E78.5 DYSLIPIDEMIA: ICD-10-CM

## 2023-05-05 DIAGNOSIS — R10.13 EPIGASTRIC ABDOMINAL PAIN: Primary | ICD-10-CM

## 2023-05-05 LAB
25(OH)D3+25(OH)D2 SERPL-MCNC: 37 NG/ML (ref 30–96)
ALBUMIN SERPL BCP-MCNC: 4.2 G/DL (ref 3.5–5.2)
ALP SERPL-CCNC: 69 U/L (ref 55–135)
ALT SERPL W/O P-5'-P-CCNC: 26 U/L (ref 10–44)
ANION GAP SERPL CALC-SCNC: 10 MMOL/L (ref 8–16)
AST SERPL-CCNC: 23 U/L (ref 10–40)
BASOPHILS # BLD AUTO: 0.08 K/UL (ref 0–0.2)
BASOPHILS NFR BLD: 1 % (ref 0–1.9)
BILIRUB SERPL-MCNC: 1.1 MG/DL (ref 0.1–1)
BUN SERPL-MCNC: 22 MG/DL (ref 8–23)
CALCIUM SERPL-MCNC: 9.8 MG/DL (ref 8.7–10.5)
CHLORIDE SERPL-SCNC: 102 MMOL/L (ref 95–110)
CO2 SERPL-SCNC: 28 MMOL/L (ref 23–29)
CRC RECOMMENDATION EXT: NORMAL
CREAT SERPL-MCNC: 1 MG/DL (ref 0.5–1.4)
DIFFERENTIAL METHOD: ABNORMAL
EOSINOPHIL # BLD AUTO: 0.4 K/UL (ref 0–0.5)
EOSINOPHIL NFR BLD: 5.3 % (ref 0–8)
ERYTHROCYTE [DISTWIDTH] IN BLOOD BY AUTOMATED COUNT: 12.5 % (ref 11.5–14.5)
EST. GFR  (NO RACE VARIABLE): 58.8 ML/MIN/1.73 M^2
ESTIMATED AVG GLUCOSE: 111 MG/DL (ref 68–131)
GLUCOSE SERPL-MCNC: 89 MG/DL (ref 70–110)
HBA1C MFR BLD: 5.5 % (ref 4–5.6)
HCT VFR BLD AUTO: 43.1 % (ref 37–48.5)
HGB BLD-MCNC: 13.7 G/DL (ref 12–16)
IMM GRANULOCYTES # BLD AUTO: 0.01 K/UL (ref 0–0.04)
IMM GRANULOCYTES NFR BLD AUTO: 0.1 % (ref 0–0.5)
LIPASE SERPL-CCNC: 36 U/L (ref 4–60)
LYMPHOCYTES # BLD AUTO: 2 K/UL (ref 1–4.8)
LYMPHOCYTES NFR BLD: 25.3 % (ref 18–48)
MCH RBC QN AUTO: 28.7 PG (ref 27–31)
MCHC RBC AUTO-ENTMCNC: 31.8 G/DL (ref 32–36)
MCV RBC AUTO: 90 FL (ref 82–98)
MONOCYTES # BLD AUTO: 0.6 K/UL (ref 0.3–1)
MONOCYTES NFR BLD: 8.2 % (ref 4–15)
NEUTROPHILS # BLD AUTO: 4.6 K/UL (ref 1.8–7.7)
NEUTROPHILS NFR BLD: 60.1 % (ref 38–73)
NRBC BLD-RTO: 0 /100 WBC
PLATELET # BLD AUTO: 259 K/UL (ref 150–450)
PMV BLD AUTO: 10.7 FL (ref 9.2–12.9)
POTASSIUM SERPL-SCNC: 4.5 MMOL/L (ref 3.5–5.1)
PROT SERPL-MCNC: 7.6 G/DL (ref 6–8.4)
RBC # BLD AUTO: 4.77 M/UL (ref 4–5.4)
SODIUM SERPL-SCNC: 140 MMOL/L (ref 136–145)
WBC # BLD AUTO: 7.7 K/UL (ref 3.9–12.7)

## 2023-05-05 PROCEDURE — 90750 HZV VACC RECOMBINANT IM: CPT | Mod: PBBFAC

## 2023-05-05 PROCEDURE — 99214 OFFICE O/P EST MOD 30 MIN: CPT | Mod: S$PBB,,, | Performed by: INTERNAL MEDICINE

## 2023-05-05 PROCEDURE — 83690 ASSAY OF LIPASE: CPT | Performed by: INTERNAL MEDICINE

## 2023-05-05 PROCEDURE — 99213 OFFICE O/P EST LOW 20 MIN: CPT | Mod: PBBFAC,25 | Performed by: INTERNAL MEDICINE

## 2023-05-05 PROCEDURE — 83036 HEMOGLOBIN GLYCOSYLATED A1C: CPT | Performed by: INTERNAL MEDICINE

## 2023-05-05 PROCEDURE — 99999 PR PBB SHADOW E&M-EST. PATIENT-LVL III: ICD-10-PCS | Mod: PBBFAC,,, | Performed by: INTERNAL MEDICINE

## 2023-05-05 PROCEDURE — 76700 US ABDOMEN COMPLETE: ICD-10-PCS | Mod: 26,,, | Performed by: RADIOLOGY

## 2023-05-05 PROCEDURE — 99999 PR PBB SHADOW E&M-EST. PATIENT-LVL III: CPT | Mod: PBBFAC,,, | Performed by: INTERNAL MEDICINE

## 2023-05-05 PROCEDURE — 76700 US EXAM ABDOM COMPLETE: CPT | Mod: 26,,, | Performed by: RADIOLOGY

## 2023-05-05 PROCEDURE — 99214 PR OFFICE/OUTPT VISIT, EST, LEVL IV, 30-39 MIN: ICD-10-PCS | Mod: S$PBB,,, | Performed by: INTERNAL MEDICINE

## 2023-05-05 PROCEDURE — 76700 US EXAM ABDOM COMPLETE: CPT | Mod: TC

## 2023-05-05 PROCEDURE — 82306 VITAMIN D 25 HYDROXY: CPT | Performed by: INTERNAL MEDICINE

## 2023-05-05 PROCEDURE — 80053 COMPREHEN METABOLIC PANEL: CPT | Performed by: INTERNAL MEDICINE

## 2023-05-05 PROCEDURE — 90471 IMMUNIZATION ADMIN: CPT | Mod: PBBFAC

## 2023-05-05 PROCEDURE — 85025 COMPLETE CBC W/AUTO DIFF WBC: CPT | Performed by: INTERNAL MEDICINE

## 2023-05-05 RX ORDER — LORAZEPAM 0.5 MG/1
TABLET ORAL
Qty: 15 TABLET | Refills: 0 | Status: SHIPPED | OUTPATIENT
Start: 2023-05-05

## 2023-05-05 RX ORDER — PANTOPRAZOLE SODIUM 40 MG/1
40 TABLET, DELAYED RELEASE ORAL DAILY
Qty: 90 TABLET | Refills: 1 | Status: SHIPPED | OUTPATIENT
Start: 2023-05-05 | End: 2023-08-11

## 2023-05-05 NOTE — PROGRESS NOTES
Subjective:       Patient ID: Maya Santos is a 77 y.o. female who presents today for:    Chief Complaint:   Chief Complaint   Patient presents with    Chronic Abdominal Pain       HPI:  Very pleasant 77-year-old female who is here for a subacute care visit of epigastric abdominal discomfort radiating into the chest and accompanied by some heartburn.  This discomfort has happened before but it is been increased and more frequent in the past couple of weeks.  Of note she started semaglutide in January and for the past 4 weeks has been on 0.5 mg dose.  She has lost 3 lb in the past 4 months.  She denies any blood in her stool or black stool.  She did read that the semaglutide can cause pancreatitis.  She does have a gallbladder as well.  She has never had an EGD that she recalls and her last abdominal imaging was many years ago.  Patient also has a thyroid nodule that needs fine-needle aspiration she has an appointment with Endocrine in a few weeks.  She has had multinodular order for quite some time.  She is not on thyroid supplement.  She had a fine-needle aspiration of the same nodule back in 2012 but it is more than doubled in size by ultrasound done in January.     Review of Systems   Constitutional:  Negative for chills, fever and weight loss.   HENT:  Negative for sore throat.    Eyes:  Negative for blurred vision and double vision.   Respiratory:  Negative for cough and shortness of breath.    Cardiovascular:  Negative for chest pain and palpitations.   Gastrointestinal:  Positive for abdominal pain and heartburn. Negative for constipation, diarrhea, nausea and vomiting.        See HPI   Genitourinary:  Negative for dysuria and hematuria.   Musculoskeletal:  Negative for joint pain and myalgias.   Skin:  Negative for itching and rash.   Neurological:  Negative for sensory change, focal weakness and headaches.   Endo/Heme/Allergies:  Does not bruise/bleed easily.   Psychiatric/Behavioral:  Negative for  depression and suicidal ideas.       Medications:  Outpatient Encounter Medications as of 5/5/2023   Medication Sig Dispense Refill    irbesartan-hydrochlorothiazide (AVALIDE) 150-12.5 mg per tablet Take 1 tablet by mouth once daily. 90 tablet 3    omega-3 fatty acids/fish oil (FISH OIL-OMEGA-3 FATTY ACIDS) 300-1,000 mg capsule Take by mouth once daily.      rosuvastatin (CRESTOR) 20 MG tablet Take 1 tablet (20 mg total) by mouth once daily. 90 tablet 1    semaglutide (OZEMPIC) 0.25 mg or 0.5 mg(2 mg/1.5 mL) pen injector Inject 0.5 mg into the skin every 7 days. 1 each 3    UNABLE TO FIND Circulation & Vein Support      UNABLE TO FIND Essential One Multi vit      UNABLE TO FIND medication name:marine collagen      UNABLE TO FIND Lauren Beets      [DISCONTINUED] semaglutide (OZEMPIC) 0.25 mg or 0.5 mg(2 mg/1.5 mL) pen injector Inject 0.5 mg into the skin every 7 days. 1 each 3    LORazepam (ATIVAN) 0.5 MG tablet 1- 2 prior to procedure 15 tablet 0    pantoprazole (PROTONIX) 40 MG tablet Take 1 tablet (40 mg total) by mouth once daily. 90 tablet 1     No facility-administered encounter medications on file as of 5/5/2023.       Allergies:  Review of patient's allergies indicates:  No Known Allergies    Health Maintenance:  Immunization History   Administered Date(s) Administered    COVID-19, MRNA, LN-S, PF (Pfizer) (Purple Cap) 01/07/2021, 01/28/2021, 09/28/2021    COVID-19, mRNA, LNP-S, bivalent booster, PF (PFIZER OMICRON) 10/25/2022    DT (Pediatric) 05/20/2005    Influenza 10/02/2009, 10/29/2011, 11/15/2014, 10/31/2015, 10/21/2020    Influenza (FLUAD) - Quadrivalent - Adjuvanted - PF *Preferred* (65+) 10/12/2022    Influenza - High Dose - PF (65 years and older) 10/26/2013, 11/02/2016, 10/19/2017, 09/11/2018, 10/08/2019    Influenza - Trivalent (ADULT) 10/02/2009, 10/29/2011    Influenza A (H1N1) 2009 Monovalent - IM 11/21/2009    Pneumococcal Polysaccharide - 23 Valent 11/28/2012, 07/12/2013    Td (ADULT)  05/20/2005    Td - PF (ADULT) 05/20/2005    Tdap 07/12/2013, 11/23/2022    Zoster 09/12/2014    Zoster Recombinant 01/04/2023, 05/05/2023      Health Maintenance   Topic Date Due    DEXA Scan  02/06/2026    Lipid Panel  01/04/2028    TETANUS VACCINE  11/23/2032    Hepatitis C Screening  Completed          Objective:      Vital Signs  Temp: 98.4 °F (36.9 °C)  Pulse: 60  SpO2: 97 %  BP: 138/88  Pain Score: 0-No pain  Height and Weight  Weight: 89.8 kg (198 lb 1.3 oz)]    Physical Exam  HENT:      Head: Normocephalic and atraumatic.   Eyes:      General: No scleral icterus.  Cardiovascular:      Rate and Rhythm: Normal rate and regular rhythm.      Heart sounds: No murmur heard.  Pulmonary:      Effort: Pulmonary effort is normal.      Breath sounds: Normal breath sounds. No rales.   Abdominal:      General: Bowel sounds are normal.      Palpations: Abdomen is soft.      Tenderness: There is abdominal tenderness in the epigastric area.   Musculoskeletal:         General: No tenderness.      Cervical back: Neck supple.   Neurological:      Mental Status: She is alert and oriented to person, place, and time.   Psychiatric:         Mood and Affect: Mood normal.         Behavior: Behavior normal.         Thought Content: Thought content normal.         Judgment: Judgment normal.      Lab Results   Component Value Date    WBC 7.70 05/05/2023    HGB 13.7 05/05/2023    HCT 43.1 05/05/2023     05/05/2023    CHOL 179 01/04/2023    TRIG 98 01/04/2023    HDL 37 (L) 01/04/2023    ALT 25 01/04/2023    AST 24 01/04/2023     01/04/2023    K 4.3 01/04/2023     01/04/2023    CREATININE 0.9 01/04/2023    BUN 21 01/04/2023    CO2 28 01/04/2023    TSH 2.677 01/04/2023    HGBA1C 5.4 01/04/2023     Assessment/plan:     Maya Santos is a 77 y.o.female with:    Epigastric abdominal pain  -     Ambulatory referral/consult to Endo Procedure ; Future; Expected date: 05/06/2023  -     CBC Auto Differential; Future;  Expected date: 05/05/2023  -     Comprehensive Metabolic Panel; Future; Expected date: 05/05/2023  -     LIPASE; Future; Expected date: 05/05/2023  -     US Abdomen Complete; Future  -     pantoprazole (PROTONIX) 40 MG tablet; Take 1 tablet (40 mg total) by mouth once daily.  Dispense: 90 tablet; Refill: 1    Prediabetes  -     Hemoglobin A1C; Future; Expected date: 05/05/2023  -     semaglutide (OZEMPIC) 0.25 mg or 0.5 mg(2 mg/1.5 mL) pen injector; Inject 0.5 mg into the skin every 7 days.  Dispense: 1 each; Refill: 3    Dyslipidemia   Now on rouvastatin 20 mg.  Will wait until her annual to check lipid profile on the new stronger statin.    Vitamin D deficiency- not checked in a while and patient has osteopenia   -     Vitamin D; Future; Expected date: 05/05/2023    Other orders  -     LORazepam (ATIVAN) 0.5 MG tablet; 1- 2 prior to procedure  Dispense: 15 tablet; Refill: 0    -     (In Office Administered) Zoster Recombinant Vaccine        Future Appointments   Date Time Provider Department Center   6/29/2023  3:30 PM Domingo Plascencia MD [facility]       Jody Ruggiero MD  [facility]

## 2023-05-08 ENCOUNTER — HOSPITAL ENCOUNTER (OUTPATIENT)
Facility: HOSPITAL | Age: 78
Discharge: HOME | End: 2023-05-08
Attending: INTERNAL MEDICINE | Admitting: INTERNAL MEDICINE
Payer: MEDICARE

## 2023-05-08 ENCOUNTER — ANESTHESIA EVENT (OUTPATIENT)
Dept: ENDOSCOPY | Facility: HOSPITAL | Age: 78
End: 2023-05-08
Payer: MEDICARE

## 2023-05-08 ENCOUNTER — ANESTHESIA (OUTPATIENT)
Dept: ENDOSCOPY | Facility: HOSPITAL | Age: 78
End: 2023-05-08
Payer: MEDICARE

## 2023-05-08 VITALS
DIASTOLIC BLOOD PRESSURE: 84 MMHG | HEART RATE: 55 BPM | OXYGEN SATURATION: 95 % | WEIGHT: 198 LBS | BODY MASS INDEX: 31.82 KG/M2 | TEMPERATURE: 97 F | RESPIRATION RATE: 18 BRPM | HEIGHT: 66 IN | SYSTOLIC BLOOD PRESSURE: 124 MMHG

## 2023-05-08 DIAGNOSIS — R10.9 ABDOMINAL PAIN: ICD-10-CM

## 2023-05-08 DIAGNOSIS — E04.2 MULTINODULAR GOITER: Primary | ICD-10-CM

## 2023-05-08 PROCEDURE — 88305 TISSUE EXAM BY PATHOLOGIST: ICD-10-PCS | Mod: 26,,, | Performed by: PATHOLOGY

## 2023-05-08 PROCEDURE — 43239 PR EGD, FLEX, W/BIOPSY, SGL/MULTI: ICD-10-PCS | Mod: ,,, | Performed by: INTERNAL MEDICINE

## 2023-05-08 PROCEDURE — 37000008 HC ANESTHESIA 1ST 15 MINUTES: Performed by: INTERNAL MEDICINE

## 2023-05-08 PROCEDURE — 88305 TISSUE EXAM BY PATHOLOGIST: CPT | Mod: 26,,, | Performed by: PATHOLOGY

## 2023-05-08 PROCEDURE — D9220A PRA ANESTHESIA: Mod: ANES,,, | Performed by: SURGERY

## 2023-05-08 PROCEDURE — D9220A PRA ANESTHESIA: ICD-10-PCS | Mod: ANES,,, | Performed by: SURGERY

## 2023-05-08 PROCEDURE — 25000003 PHARM REV CODE 250: Performed by: NURSE ANESTHETIST, CERTIFIED REGISTERED

## 2023-05-08 PROCEDURE — 63600175 PHARM REV CODE 636 W HCPCS: Performed by: NURSE ANESTHETIST, CERTIFIED REGISTERED

## 2023-05-08 PROCEDURE — D9220A PRA ANESTHESIA: ICD-10-PCS | Mod: CRNA,,, | Performed by: NURSE ANESTHETIST, CERTIFIED REGISTERED

## 2023-05-08 PROCEDURE — 27201012 HC FORCEPS, HOT/COLD, DISP: Performed by: INTERNAL MEDICINE

## 2023-05-08 PROCEDURE — D9220A PRA ANESTHESIA: Mod: CRNA,,, | Performed by: NURSE ANESTHETIST, CERTIFIED REGISTERED

## 2023-05-08 PROCEDURE — 43239 EGD BIOPSY SINGLE/MULTIPLE: CPT | Performed by: INTERNAL MEDICINE

## 2023-05-08 PROCEDURE — 43239 EGD BIOPSY SINGLE/MULTIPLE: CPT | Mod: ,,, | Performed by: INTERNAL MEDICINE

## 2023-05-08 PROCEDURE — 88305 TISSUE EXAM BY PATHOLOGIST: CPT | Performed by: PATHOLOGY

## 2023-05-08 PROCEDURE — 37000009 HC ANESTHESIA EA ADD 15 MINS: Performed by: INTERNAL MEDICINE

## 2023-05-08 RX ORDER — LIDOCAINE HYDROCHLORIDE 20 MG/ML
INJECTION INTRAVENOUS
Status: DISCONTINUED | OUTPATIENT
Start: 2023-05-08 | End: 2023-05-08

## 2023-05-08 RX ORDER — PANTOPRAZOLE SODIUM 40 MG/1
40 TABLET, DELAYED RELEASE ORAL 2 TIMES DAILY
Qty: 60 TABLET | Refills: 11 | Status: SHIPPED | OUTPATIENT
Start: 2023-05-08 | End: 2023-08-11

## 2023-05-08 RX ORDER — PROPOFOL 10 MG/ML
INJECTION, EMULSION INTRAVENOUS
Status: DISCONTINUED | OUTPATIENT
Start: 2023-05-08 | End: 2023-05-08

## 2023-05-08 RX ORDER — PROPOFOL 10 MG/ML
INJECTION, EMULSION INTRAVENOUS CONTINUOUS PRN
Status: DISCONTINUED | OUTPATIENT
Start: 2023-05-08 | End: 2023-05-08

## 2023-05-08 RX ORDER — SODIUM CHLORIDE 0.9 % (FLUSH) 0.9 %
10 SYRINGE (ML) INJECTION
Status: DISCONTINUED | OUTPATIENT
Start: 2023-05-08 | End: 2023-05-08 | Stop reason: HOSPADM

## 2023-05-08 RX ADMIN — LIDOCAINE HYDROCHLORIDE 100 MG: 20 INJECTION INTRAVENOUS at 07:05

## 2023-05-08 RX ADMIN — PROPOFOL 50 MG: 10 INJECTION, EMULSION INTRAVENOUS at 07:05

## 2023-05-08 RX ADMIN — PROPOFOL 30 MG: 10 INJECTION, EMULSION INTRAVENOUS at 07:05

## 2023-05-08 RX ADMIN — SODIUM CHLORIDE: 0.9 INJECTION, SOLUTION INTRAVENOUS at 07:05

## 2023-05-08 RX ADMIN — PROPOFOL 175 MCG/KG/MIN: 10 INJECTION, EMULSION INTRAVENOUS at 07:05

## 2023-05-08 NOTE — ANESTHESIA POSTPROCEDURE EVALUATION
Anesthesia Post Evaluation    Patient: Maya Santos    Procedure(s) Performed: Procedure(s) (LRB):  EGD (ESOPHAGOGASTRODUODENOSCOPY) (N/A)    Final Anesthesia Type: general      Patient location during evaluation: [facility]  Patient participation: Yes- Able to Participate  Level of consciousness: awake and alert and oriented  Post-procedure vital signs: reviewed and stable  Pain management: adequate  Airway patency: patent  IRVING mitigation strategies: Multimodal analgesia  PONV status at discharge: No PONV  Anesthetic complications: no      Cardiovascular status: blood pressure returned to baseline and hemodynamically stable  Respiratory status: unassisted, spontaneous ventilation and room air  Hydration status: euvolemic  Follow-up not needed.          Vitals Value Taken Time   /84 05/08/23 0828   Temp 36.3 °C (97.3 °F) 05/08/23 0756   Pulse 55 05/08/23 0828   Resp 18 05/08/23 0828   SpO2 95 % 05/08/23 0828   Vitals shown include unvalidated device data.      No case tracking events are documented in the log.      Pain/Cele Score: Cele Score: 9 (5/8/2023  8:15 AM)

## 2023-05-08 NOTE — PROVATION PATIENT INSTRUCTIONS
Discharge Summary/Instructions after an Endoscopic Procedure  Patient Name: Maya Santos  Patient MRN: [id number]  Patient YOB: 1945  Monday, May 8, 2023  Alexandr Olson MD  Dear patient,  As a result of recent federal legislation (The Federal Cures Act), you may   receive lab or pathology results from your procedure in your [facility]   account before your physician is able to contact you. Your physician or   their representative will relay the results to you with their   recommendations at their soonest availability.  Thank you,  RESTRICTIONS:  During your procedure today, you received medications for sedation.  These   medications may affect your judgment, balance and coordination.  Therefore,   for 24 hours, you have the following restrictions:   - DO NOT drive a car, operate machinery, make legal/financial decisions,   sign important papers or drink alcohol.    ACTIVITY:  Today: no heavy lifting, straining or running due to procedural   sedation/anesthesia.  The following day: return to full activity including work.  DIET:  Eat and drink normally unless instructed otherwise.     TREATMENT FOR COMMON SIDE EFFECTS:  - Mild abdominal pain, nausea, belching, bloating or excessive gas:  rest,   eat lightly and use a heating pad.  - Sore Throat: treat with throat lozenges and/or gargle with warm salt   water.  - Because air was used during the procedure, expelling large amounts of air   from your rectum or belching is normal.  - If a bowel prep was taken, you may not have a bowel movement for 1-3 days.    This is normal.  SYMPTOMS TO WATCH FOR AND REPORT TO YOUR PHYSICIAN:  1. Abdominal pain or bloating, other than gas cramps.  2. Chest pain.  3. Back pain.  4. Signs of infection such as: chills or fever occurring within 24 hours   after the procedure.  5. Rectal bleeding, which would show as bright red, maroon, or black stools.   (A tablespoon of blood from the rectum is not serious, especially if    hemorrhoids are present.)  6. Vomiting.  7. Weakness or dizziness.  GO DIRECTLY TO THE NEAREST EMERGENCY ROOM IF YOU HAVE ANY OF THE FOLLOWING:      Difficulty breathing              Chills and/or fever over 101 F   Persistent vomiting and/or vomiting blood   Severe abdominal pain   Severe chest pain   Black, tarry stools   Bleeding- more than one tablespoon   Any other symptom or condition that you feel may need urgent attention  Your doctor recommends these additional instructions:  If any biopsies were taken, your doctors clinic will contact you in 1 to 2   weeks with any results.  - Discharge patient to home.   - Resume previous diet.   - Continue present medications.   - Use a proton pump inhibitor PO BID.   - Await pathology results.   - GI clinic if symptoms persist  For questions, problems or results please call your physician - Alexandr Olson MD at Work:  [phone].  [facility], EMERGENCY ROOM PHONE NUMBER: [phone]  IF A COMPLICATION OR EMERGENCY SITUATION ARISES AND YOU ARE UNABLE TO REACH   YOUR PHYSICIAN - GO DIRECTLY TO THE EMERGENCY ROOM.  Alexandr Olson MD  5/8/2023 7:47:34 AM  This report has been verified and signed electronically.  Dear patient,  As a result of recent federal legislation (The Federal Cures Act), you may   receive lab or pathology results from your procedure in your [facility]   account before your physician is able to contact you. Your physician or   their representative will relay the results to you with their   recommendations at their soonest availability.  Thank you,  PROVATION

## 2023-05-08 NOTE — TRANSFER OF CARE
Anesthesia Transfer of Care Note    Patient: Maya Santos    Procedure(s) Performed: Procedure(s) (LRB):  EGD (ESOPHAGOGASTRODUODENOSCOPY) (N/A)    Patient location: [facility]    Anesthesia Type: general    Transport from OR: Transported from OR on room air with adequate spontaneous ventilation    Post pain: adequate analgesia    Post assessment: no apparent anesthetic complications    Post vital signs: stable    Level of consciousness: awake    Nausea/Vomiting: no nausea/vomiting    Complications: none    Transfer of care protocol was followed      Last vitals:   Visit Vitals  /75   Pulse 67   Temp 36   Resp 20   Ht    Wt    SpO2 93%   Breastfeeding    BMI

## 2023-05-08 NOTE — H&P
Short Stay Endoscopy   Pre-Procedure Note    PCP - Jody Ruggiero MD  Referring Physician - Jody Ruggiero MD  [address] [place] [address]    Procedure - Endoscopy    ASA - per anesthesia  Mallampati - per anesthesia    HPI  77 y.o. female scheduled for endoscopy for evaluation of    No diagnosis found.     Medical History:  has a past medical history of Arthritis, GERD (gastroesophageal reflux disease), Hyperlipidemia, Hypertension, S/P thyroid biopsy (), Venous insufficiency, and Wrist fracture, closed, right, sequela (2019).    Surgical History:  has a past surgical history that includes Adenoidectomy and  section.    Family History: family history includes Arthritis in her brother, father, and mother; Hypertension in her mother; Stroke in her father..    Social History:  reports that she has never smoked. She has never used smokeless tobacco. She reports current alcohol use. She reports that she does not use drugs.    Review of patient's allergies indicates:  No Known Allergies    Medications:   Medications Prior to Admission   Medication Sig Dispense Refill Last Dose    irbesartan-hydrochlorothiazide (AVALIDE) 150-12.5 mg per tablet Take 1 tablet by mouth once daily. 90 tablet 3 2023    omega-3 fatty acids/fish oil (FISH OIL-OMEGA-3 FATTY ACIDS) 300-1,000 mg capsule Take by mouth once daily.   2023    pantoprazole (PROTONIX) 40 MG tablet Take 1 tablet (40 mg total) by mouth once daily. 90 tablet 1 2023    rosuvastatin (CRESTOR) 20 MG tablet Take 1 tablet (20 mg total) by mouth once daily. 90 tablet 1 2023    semaglutide (OZEMPIC) 0.25 mg or 0.5 mg(2 mg/1.5 mL) pen injector Inject 0.5 mg into the skin every 7 days. 1 each 3 Past Week    UNABLE TO FIND Essential One Multi vit   2023    UNABLE TO FIND medication name:marine collagen   2023    LORazepam (ATIVAN) 0.5 MG tablet 1- 2 prior to procedure 15 tablet 0     UNABLE TO FIND Circulation & Vein Support        UNABLE TO FIND Lauren Saucedo            Labs:  Lab Results   Component Value Date    WBC 7.70 05/05/2023    HGB 13.7 05/05/2023    HCT 43.1 05/05/2023     05/05/2023    CHOL 179 01/04/2023    TRIG 98 01/04/2023    HDL 37 (L) 01/04/2023    ALT 26 05/05/2023    AST 23 05/05/2023     05/05/2023    K 4.5 05/05/2023     05/05/2023    CREATININE 1.0 05/05/2023    BUN 22 05/05/2023    CO2 28 05/05/2023    TSH 2.677 01/04/2023    HGBA1C 5.5 05/05/2023       Risks and benefits of this endoscopic procedure, including but not limited to bleeding, inflammation, infection, perforation, and death, explained to the patient prior to procedure      Alexandr Olson MD

## 2023-05-08 NOTE — PLAN OF CARE
Patient tolerating oral liquids without difficulty. No apparent s&s of distress noted at this time, no complaints voiced at this time. Discharge instructions reviewed with patient and spouse with good verbal feedback received. Patient ready for discharge

## 2023-05-08 NOTE — ANESTHESIA PREPROCEDURE EVALUATION
[facility]  Anesthesia Pre-Operative Evaluation         Patient Name: Maya Santos  YOB: 1945  MRN: [id number]    SUBJECTIVE:     2023    Pre-operative evaluation for Procedure(s) (LRB):  EGD (ESOPHAGOGASTRODUODENOSCOPY) (N/A)    Maya Santos is a 77 y.o. female with the medical history listed below who now presents for the above procedure(s).      Patient Active Problem List   Diagnosis    Benign essential hypertension    Heart murmur    Mitral valve insufficiency    Multinodular goiter    Primary hypercholesterolemia    Senile osteopenia    Vitamin D deficiency       Review of patient's allergies indicates:  No Known Allergies    Current Inpatient Medications:      No current facility-administered medications on file prior to encounter.     Current Outpatient Medications on File Prior to Encounter   Medication Sig Dispense Refill    irbesartan-hydrochlorothiazide (AVALIDE) 150-12.5 mg per tablet Take 1 tablet by mouth once daily. 90 tablet 3    omega-3 fatty acids/fish oil (FISH OIL-OMEGA-3 FATTY ACIDS) 300-1,000 mg capsule Take by mouth once daily.      pantoprazole (PROTONIX) 40 MG tablet Take 1 tablet (40 mg total) by mouth once daily. 90 tablet 1    rosuvastatin (CRESTOR) 20 MG tablet Take 1 tablet (20 mg total) by mouth once daily. 90 tablet 1    semaglutide (OZEMPIC) 0.25 mg or 0.5 mg(2 mg/1.5 mL) pen injector Inject 0.5 mg into the skin every 7 days. 1 each 3    UNABLE TO FIND Essential One Multi vit      UNABLE TO FIND medication name:marine collagen      LORazepam (ATIVAN) 0.5 MG tablet 1- 2 prior to procedure 15 tablet 0    UNABLE TO FIND Circulation & Vein Support      UNABLE TO FIND Lauren Saucedo         Past Surgical History:   Procedure Laterality Date    ADENOIDECTOMY       SECTION      two       Social History     Socioeconomic History    Marital status:    Tobacco Use    Smoking status: Never    Smokeless tobacco: Never    Substance and Sexual Activity    Alcohol use: Yes     Comment: occasionally    Drug use: Never       OBJECTIVE:     Vital Signs Range (Last 24H):  Temp:  [36.1 °C (97 °F)]   Pulse:  [68]   Resp:  [16]   BP: (139)/(82)   SpO2:  [96 %]       Significant Labs:  Lab Results   Component Value Date    WBC 7.70 05/05/2023    HGB 13.7 05/05/2023    HCT 43.1 05/05/2023     05/05/2023    CHOL 179 01/04/2023    TRIG 98 01/04/2023    HDL 37 (L) 01/04/2023    ALT 26 05/05/2023    AST 23 05/05/2023     05/05/2023    K 4.5 05/05/2023     05/05/2023    CREATININE 1.0 05/05/2023    BUN 22 05/05/2023    CO2 28 05/05/2023    TSH 2.677 01/04/2023    HGBA1C 5.5 05/05/2023         Diagnostic Studies:  No relevant studies.        ASSESSMENT/PLAN:           Pre-op Assessment    I have reviewed the Patient Summary Reports.    I have reviewed the NPO Status.   I have reviewed the Medications.     Review of Systems  Anesthesia Hx:  Denies Family Hx of Anesthesia complications.   Denies Personal Hx of Anesthesia complications.   Social:  Non-Smoker    Hematology/Oncology:  Hematology Normal   Oncology Normal     EENT/Dental:EENT/Dental Normal   Cardiovascular:   Exercise tolerance: good Hypertension Valvular problems/Murmurs, MR hyperlipidemia    Pulmonary:  Pulmonary Normal    Renal/:  Renal/ Normal     Hepatic/GI:   GERD    Musculoskeletal:  Musculoskeletal Normal    Neurological:  Neurology Normal    Endocrine:  Endocrine Normal On Ozempic for weight loss   Dermatological:  Skin Normal    Psych:  Psychiatric Normal           Physical Exam  General: Well nourished, Cooperative, Alert and Oriented    Airway:  Mallampati: II   Mouth Opening: Normal  TM Distance: Normal  Tongue: Normal  Neck ROM: Normal ROM    Dental:  Intact    Heart:  Rate: Normal  Rhythm: Regular Rhythm        Anesthesia Plan  Type of Anesthesia, risks & benefits discussed:    Anesthesia Type: Gen Natural Airway, MAC  Intra-op Monitoring Plan:  Standard ASA Monitors  Post Op Pain Control Plan: multimodal analgesia  ASA Score: 2  Day of Surgery Review of History & Physical: H&P Update referred to the surgeon/provider.    Ready For Surgery From Anesthesia Perspective.     .

## 2023-05-09 DIAGNOSIS — R73.03 PREDIABETES: ICD-10-CM

## 2023-05-09 RX ORDER — SEMAGLUTIDE 0.68 MG/ML
0.5 INJECTION, SOLUTION SUBCUTANEOUS
Qty: 3 ML | Refills: 3 | Status: SHIPPED | OUTPATIENT
Start: 2023-05-09 | End: 2023-08-11 | Stop reason: SDUPTHER

## 2023-05-12 LAB
FINAL PATHOLOGIC DIAGNOSIS: NORMAL
GROSS: NORMAL
Lab: NORMAL

## 2023-06-29 ENCOUNTER — OFFICE VISIT (OUTPATIENT)
Dept: ENDOCRINOLOGY | Facility: CLINIC | Age: 78
End: 2023-06-29
Payer: MEDICARE

## 2023-06-29 VITALS
HEART RATE: 64 BPM | BODY MASS INDEX: 32.15 KG/M2 | SYSTOLIC BLOOD PRESSURE: 149 MMHG | TEMPERATURE: 98 F | DIASTOLIC BLOOD PRESSURE: 90 MMHG | WEIGHT: 199.19 LBS

## 2023-06-29 DIAGNOSIS — E04.1 RIGHT THYROID NODULE: ICD-10-CM

## 2023-06-29 DIAGNOSIS — E04.2 MULTINODULAR GOITER: Primary | ICD-10-CM

## 2023-06-29 PROCEDURE — 99204 OFFICE O/P NEW MOD 45 MIN: CPT | Mod: S$PBB,,, | Performed by: HOSPITALIST

## 2023-06-29 PROCEDURE — 99999 PR PBB SHADOW E&M-EST. PATIENT-LVL III: CPT | Mod: PBBFAC,,, | Performed by: HOSPITALIST

## 2023-06-29 PROCEDURE — 99213 OFFICE O/P EST LOW 20 MIN: CPT | Mod: PBBFAC | Performed by: HOSPITALIST

## 2023-06-29 PROCEDURE — 99204 PR OFFICE/OUTPT VISIT, NEW, LEVL IV, 45-59 MIN: ICD-10-PCS | Mod: S$PBB,,, | Performed by: HOSPITALIST

## 2023-06-29 PROCEDURE — 99999 PR PBB SHADOW E&M-EST. PATIENT-LVL III: ICD-10-PCS | Mod: PBBFAC,,, | Performed by: HOSPITALIST

## 2023-06-29 NOTE — ASSESSMENT & PLAN NOTE
- Reviewed US Thyroid and reviewed with patient, noted multiple thyroid nodules with the 2.9 right nodule fitting criteria for FNA  - This meet FNA criteria given size, chart review show possible FNA of this nodule in 2012  - TSH reviewed: stable, reassurance given to patient about thyroid function  - I have reviewed management options including observation, FNA or surgery.  - Discussed indications for a FNA and FNA techniques were explained  - Discussed possible FNA results (benign, FLUS/AUS, follicular or hurthle lesion, suspicious for cancer, cancer and non diagnostic).  - Reviewed that a nondiagnostic or FLUS/AUS would require a repeat FNA   - With all this info and all questions were answered: at this time, patient is comfortable in pursing FNA  - Will set up FNA, per patient schedule, with [facility] endocrinology  - Pending FNA will determine further work up vs monitoring

## 2023-06-29 NOTE — PROGRESS NOTES
Subjective:      Patient ID: Maya Santos is a 77 y.o. female presented to [facility] on 6/29/2023.  Chief Complaint:  Thyroid Nodule      History of Present Illness: Maya Santos is a 77 y.o. female here for  multiple thyroid nodules  Other significant past medical history:  Prediabetes, obese    Here for evaluation Multiple Thyroid nodules:  - 1st noted with Rthyroid nodule in 2012, seen outside endocrinologist:  Dr. Darryn Plascencia with [facility] for biopsy  - Limited data, records unavailable for review.  Of note 1 documentation of 1.3 cm right thyroid nodule noted  - Family history thyroid disorder: no  - Family history thyroid cancer: no  - Tobacco user: no  - She is not on thyroid supplement.    - There is concern for the nodule increasing in size when compared to 2012  - Previous biopsy results:  2012: Right thyroid nodule:  Benign    Symptoms:  No  Yes  [x]    [] Compressive symptoms  [x]    [] Anterior neck pressure  [x]    [] Dysphagia sometimes  [x]    [] Voice changes - comes and goes     Risk Factors:  No   Yes  [x]    [] Radiation to head or neck for any treatment of cancer or exposure to radiation  [x]    [] Personal history of cancer including:  colon or breast cancer      Last ultrasound: 2/6/2023  Right lobe measures 5.7 x 2.8 x 3.6 cm.  Left lobe measures 4 x 1.3 x 1.7 cm.  Overall, parenchyma is mildly heterogeneous.  Multiple bilateral thyroid nodules are present, with 4 index nodules as detailed below.     Nodule #1 Size: 2.9 cm, Location: Right lobe midportion, Composition: solid/almost completely solid (2)  Echogenicity: isoechoic (1), Shape: wider-than-tall (0), Margins: smooth (0) Echogenic foci: none (0)  Change: N/A  TI-RADS category: TR3 (3 points) - follow up is recommended at 1, 3, and 5 years if 1.5 cm or greater; FNA is recommended if 2.5 cm or greater.     Nodule #2 Size: 1 cm Location: Right lobe upper pole Composition: solid/almost completely solid  (2)  Echogenicity: isoechoic (1) Shape: wider-than-tall (0) Margins: smooth (0) Echogenic foci: none (0)  Change: N/A  TI-RADS category: TR3 (3 points) - follow up is recommended at 1, putting anything else 3, and 5 years if 1.5 cm or greater; FNA is recommended if 2.5 cm or greater.  Macrocalcification right mid thyroid measures 0.6 cm.    Impression:  Multinodular thyroid gland.  Dominant nodule on the right meet criteria for FNA if not already performed.     Lab work reviewed  Lab Results   Component Value Date    TSH 2.677 01/04/2023      Antibodies  No results found for: THYROIDAB, TSIMMGLBN, THYROIDSTIMI      Reviewed past surgical, medical, family, social history and updated as appropriate.  Review of Systems: see HPI above  Objective:   BP (!) 149/90   Pulse 64   Temp 98.2 °F (36.8 °C) (Oral)   Wt 90.4 kg (199 lb 3.2 oz)   BMI 32.15 kg/m²   Body mass index is 32.15 kg/m².  Vital signs reviewed    Physical Exam  Vitals and nursing note reviewed.   Constitutional:       Appearance: Normal appearance. She is well-developed. She is not ill-appearing.   Neck:      Thyroid: No thyromegaly.      Comments: Subtle right thyroid goiter.  Left side small  Pulmonary:      Effort: Pulmonary effort is normal. No respiratory distress.   Musculoskeletal:         General: Normal range of motion.      Cervical back: Normal range of motion.   Neurological:      General: No focal deficit present.      Mental Status: She is alert. Mental status is at baseline.   Psychiatric:         Mood and Affect: Mood normal.         Behavior: Behavior normal.       Lab Reviewed:  See results in subjective  Lab Results   Component Value Date    HGBA1C 5.5 05/05/2023     Lab Results   Component Value Date    CHOL 179 01/04/2023    HDL 37 (L) 01/04/2023    LDLCALC 122.4 01/04/2023    TRIG 98 01/04/2023    CHOLHDL 20.7 01/04/2023     Lab Results   Component Value Date     05/05/2023    K 4.5 05/05/2023     05/05/2023    CO2 28  05/05/2023    GLU 89 05/05/2023    BUN 22 05/05/2023    CREATININE 1.0 05/05/2023    CALCIUM 9.8 05/05/2023    PROT 7.6 05/05/2023    ALBUMIN 4.2 05/05/2023    BILITOT 1.1 (H) 05/05/2023    ALKPHOS 69 05/05/2023    AST 23 05/05/2023    ALT 26 05/05/2023    ANIONGAP 10 05/05/2023    TSH 2.677 01/04/2023    [id number] 37 05/05/2023     Assessment     1. Multinodular goiter  US FNA Thyroid, 1st Lesion      2. Right thyroid nodule  Ambulatory referral/consult to Endocrinology    US FNA Thyroid, 1st Lesion         Plan     Multinodular goiter  - Reviewed US Thyroid and reviewed with patient, noted multiple thyroid nodules with the 2.9 right nodule fitting criteria for FNA  - This meet FNA criteria given size, chart review show possible FNA of this nodule in 2012  - TSH reviewed: stable, reassurance given to patient about thyroid function  - I have reviewed management options including observation, FNA or surgery.  - Discussed indications for a FNA and FNA techniques were explained  - Discussed possible FNA results (benign, FLUS/AUS, follicular or hurthle lesion, suspicious for cancer, cancer and non diagnostic).  - Reviewed that a nondiagnostic or FLUS/AUS would require a repeat FNA   - With all this info and all questions were answered: at this time, patient is comfortable in pursing FNA  - Will set up FNA, per patient schedule, with [facility]  - Pending FNA will determine further work up vs monitoring    Advised patient to follow up with PCP for routine health maintenance care.   RTC in pending workup, yearly    Domingo Plascencia M.D.  Endocrinology  [facility]  6/29/2023      Disclaimer: This note has been generated in part with the use of voice-recognition software. There may be typographical errors that have been missed during proof-reading.

## 2023-08-11 ENCOUNTER — CLINICAL SUPPORT (OUTPATIENT)
Dept: INTERNAL MEDICINE | Facility: CLINIC | Age: 78
End: 2023-08-11
Payer: MEDICARE

## 2023-08-11 ENCOUNTER — OFFICE VISIT (OUTPATIENT)
Dept: INTERNAL MEDICINE | Facility: CLINIC | Age: 78
End: 2023-08-11
Payer: MEDICARE

## 2023-08-11 VITALS
OXYGEN SATURATION: 97 % | SYSTOLIC BLOOD PRESSURE: 122 MMHG | BODY MASS INDEX: 31.56 KG/M2 | WEIGHT: 195.56 LBS | TEMPERATURE: 99 F | DIASTOLIC BLOOD PRESSURE: 84 MMHG | HEART RATE: 67 BPM

## 2023-08-11 DIAGNOSIS — K26.9 DUODENAL EROSION: Primary | ICD-10-CM

## 2023-08-11 DIAGNOSIS — K59.04 CHRONIC IDIOPATHIC CONSTIPATION: ICD-10-CM

## 2023-08-11 DIAGNOSIS — K21.9 GASTROESOPHAGEAL REFLUX DISEASE, UNSPECIFIED WHETHER ESOPHAGITIS PRESENT: ICD-10-CM

## 2023-08-11 DIAGNOSIS — E04.1 RIGHT THYROID NODULE: ICD-10-CM

## 2023-08-11 DIAGNOSIS — I10 BENIGN ESSENTIAL HYPERTENSION: ICD-10-CM

## 2023-08-11 DIAGNOSIS — R73.03 PREDIABETES: ICD-10-CM

## 2023-08-11 LAB — TSH SERPL DL<=0.005 MIU/L-ACNC: 2.41 UIU/ML (ref 0.4–4)

## 2023-08-11 PROCEDURE — G0009 ADMIN PNEUMOCOCCAL VACCINE: HCPCS | Mod: PBBFAC

## 2023-08-11 PROCEDURE — 99999 PR PBB SHADOW E&M-EST. PATIENT-LVL III: ICD-10-PCS | Mod: PBBFAC,,, | Performed by: INTERNAL MEDICINE

## 2023-08-11 PROCEDURE — 99999PBSHW PNEUMOCOCCAL CONJUGATE VACCINE 13-VALENT LESS THAN 5YO & GREATER THAN: ICD-10-PCS | Mod: PBBFAC,,,

## 2023-08-11 PROCEDURE — 90670 PCV13 VACCINE IM: CPT | Mod: PBBFAC

## 2023-08-11 PROCEDURE — 99999PBSHW PNEUMOCOCCAL CONJUGATE VACCINE 13-VALENT LESS THAN 5YO & GREATER THAN: Mod: PBBFAC,,,

## 2023-08-11 PROCEDURE — 99999 PR PBB SHADOW E&M-EST. PATIENT-LVL III: CPT | Mod: PBBFAC,,, | Performed by: INTERNAL MEDICINE

## 2023-08-11 PROCEDURE — 99214 OFFICE O/P EST MOD 30 MIN: CPT | Mod: S$PBB,,, | Performed by: INTERNAL MEDICINE

## 2023-08-11 PROCEDURE — 84443 ASSAY THYROID STIM HORMONE: CPT | Performed by: INTERNAL MEDICINE

## 2023-08-11 PROCEDURE — 99213 OFFICE O/P EST LOW 20 MIN: CPT | Mod: PBBFAC,25 | Performed by: INTERNAL MEDICINE

## 2023-08-11 PROCEDURE — 99214 PR OFFICE/OUTPT VISIT, EST, LEVL IV, 30-39 MIN: ICD-10-PCS | Mod: S$PBB,,, | Performed by: INTERNAL MEDICINE

## 2023-08-11 RX ORDER — PANTOPRAZOLE SODIUM 20 MG/1
20 TABLET, DELAYED RELEASE ORAL DAILY
Qty: 90 TABLET | Refills: 1 | Status: SHIPPED | OUTPATIENT
Start: 2023-08-11 | End: 2024-02-15

## 2023-08-11 RX ORDER — PANTOPRAZOLE SODIUM 20 MG/1
20 TABLET, DELAYED RELEASE ORAL DAILY
COMMUNITY
End: 2023-08-11 | Stop reason: SDUPTHER

## 2023-08-11 RX ORDER — CHOLECALCIFEROL (VITAMIN D3) 25 MCG
1000 TABLET ORAL DAILY
COMMUNITY

## 2023-08-11 RX ORDER — SEMAGLUTIDE 0.68 MG/ML
0.5 INJECTION, SOLUTION SUBCUTANEOUS
Qty: 3 ML | Refills: 3 | Status: SHIPPED | OUTPATIENT
Start: 2023-08-11 | End: 2023-12-15

## 2023-08-12 NOTE — PROGRESS NOTES
Subjective:       Patient ID: Maya Santos is a 77 y.o. female who presents today for:    Chief Complaint:   Chief Complaint   Patient presents with    Constipation    Results       HPI:  Very pleasant 77-year-old nonsmoking female with a past medical history significant for dyslipidemia, hypertension, GERD for which she is not taking regularly her pantoprazole.  The patient came in today to follow-up on the thyroid nodule that has increased in size on the right thyroid lobe.  She had an FNA in 2012 which was negative and she recently establish with endocrinology here at [facility] in the thyroid ultrasound that was done showed that the thyroid nodule increased from 1.3 cm 2.9 cm, her biopsy however is not until October.    Second issue is continue constipation which she attributed to being on semaglutide.; however she is been having more heartburn on semaglutide but also more postprandial epigastric abdominal discomfort for which she has been taking multiple Tums.  .  The upper endoscopy done in May did show duodenal erosions and it was negative for pylori.  She was somewhat scared to take the pantoprazole every day as she had done some reading about side effects.    The constipation she has not really tried any over-the-counter medications except for soon softeners.  She does have a natural substance with aloe vera and senna and is interested in starting this this evening.  Last colonoscopy was in April 2022 and was fairly unremarkable performed by Dr. Young.    On semaglutide for 3 months she is on 0.5 mg in his lost about 5 lb.  She has however been traveling.    Review of Systems   Constitutional:  Negative for chills, fever and weight loss.   HENT:  Negative for sore throat.    Eyes:  Negative for blurred vision and double vision.   Respiratory:  Negative for cough and shortness of breath.    Cardiovascular:  Negative for chest pain and palpitations.   Gastrointestinal:  Positive for abdominal pain,  constipation and heartburn. Negative for diarrhea, nausea and vomiting.        Epigastric more after meals.   Genitourinary:  Negative for dysuria and hematuria.   Musculoskeletal:  Negative for joint pain and myalgias.   Skin:  Negative for itching and rash.   Neurological:  Negative for sensory change, focal weakness and headaches.   Endo/Heme/Allergies:  Does not bruise/bleed easily.   Psychiatric/Behavioral:  Negative for depression and suicidal ideas.         Medications:  Outpatient Encounter Medications as of 8/11/2023   Medication Sig Dispense Refill    irbesartan-hydrochlorothiazide (AVALIDE) 150-12.5 mg per tablet Take 1 tablet by mouth once daily. 90 tablet 3    omega-3 fatty acids/fish oil (FISH OIL-OMEGA-3 FATTY ACIDS) 300-1,000 mg capsule Take by mouth once daily.      rosuvastatin (CRESTOR) 20 MG tablet Take 1 tablet (20 mg total) by mouth once daily. 90 tablet 1    UNABLE TO FIND Circulation & Vein Support      UNABLE TO FIND Essential One Multi vit      UNABLE TO FIND medication name:marine collagen      vitamin D (VITAMIN D3) 1000 units Tab Take 1,000 Units by mouth once daily.      [DISCONTINUED] pantoprazole (PROTONIX) 20 MG tablet Take 20 mg by mouth once daily.      [DISCONTINUED] semaglutide (OZEMPIC) 0.25 mg or 0.5 mg (2 mg/3 mL) pen injector Inject 0.5 mg into the skin every 7 days. 3 mL 3    LORazepam (ATIVAN) 0.5 MG tablet 1- 2 prior to procedure (Patient not taking: Reported on 8/11/2023) 15 tablet 0    pantoprazole (PROTONIX) 20 MG tablet Take 1 tablet (20 mg total) by mouth once daily. 90 tablet 1    semaglutide (OZEMPIC) 0.25 mg or 0.5 mg (2 mg/3 mL) pen injector Inject 0.5 mg into the skin every 7 days. 3 mL 3    [DISCONTINUED] pantoprazole (PROTONIX) 40 MG tablet Take 1 tablet (40 mg total) by mouth once daily. 90 tablet 1    [DISCONTINUED] pantoprazole (PROTONIX) 40 MG tablet Take 1 tablet (40 mg total) by mouth 2 (two) times daily. (Patient not taking: Reported on 8/11/2023) 60  tablet 11    [DISCONTINUED] UNABLE TO FIND Lauren Saucedo       No facility-administered encounter medications on file as of 8/11/2023.       Allergies:  Review of patient's allergies indicates:  No Known Allergies    Health Maintenance:  Immunization History   Administered Date(s) Administered    COVID-19, MRNA, LN-S, PF (Pfizer) (Purple Cap) 01/07/2021, 01/28/2021, 09/28/2021    COVID-19, mRNA, LNP-S, bivalent booster, PF (PFIZER OMICRON) 10/25/2022    DT (Pediatric) 05/20/2005    Influenza 10/02/2009, 10/29/2011, 11/15/2014, 10/31/2015, 10/21/2020    Influenza (FLUAD) - Quadrivalent - Adjuvanted - PF *Preferred* (65+) 10/12/2022    Influenza - High Dose - PF (65 years and older) 10/26/2013, 11/02/2016, 10/19/2017, 09/11/2018, 10/08/2019    Influenza - Trivalent (ADULT) 10/02/2009, 10/29/2011    Influenza A (H1N1) 2009 Monovalent - IM 11/21/2009    Pneumococcal Conjugate - 13 Valent 08/11/2023    Pneumococcal Polysaccharide - 23 Valent 11/28/2012, 07/12/2013    Td (ADULT) 05/20/2005    Td - PF (ADULT) 05/20/2005    Tdap 07/12/2013, 11/23/2022    Zoster 09/12/2014    Zoster Recombinant 01/04/2023, 05/05/2023      Health Maintenance   Topic Date Due    DEXA Scan  02/06/2026    Lipid Panel  01/04/2028    TETANUS VACCINE  11/23/2032    Hepatitis C Screening  Completed          Objective:      Vital Signs  Temp: 98.6 °F (37 °C)  Pulse: 67  SpO2: 97 %  BP: 122/84  Pain Score: 0-No pain  Height and Weight  Weight: 88.7 kg (195 lb 8.8 oz)]    Physical Exam  HENT:      Head: Normocephalic and atraumatic.   Eyes:      General: No scleral icterus.  Cardiovascular:      Rate and Rhythm: Normal rate and regular rhythm.      Heart sounds: No murmur heard.  Pulmonary:      Effort: Pulmonary effort is normal.      Breath sounds: Normal breath sounds. No rales.   Abdominal:      General: Bowel sounds are normal.      Palpations: Abdomen is soft.      Tenderness: There is abdominal tenderness in the epigastric area.      Comments:  Minimal   Musculoskeletal:         General: No tenderness.      Cervical back: Neck supple.   Neurological:      Mental Status: She is alert and oriented to person, place, and time.   Psychiatric:         Mood and Affect: Mood normal.         Behavior: Behavior normal.         Thought Content: Thought content normal.         Judgment: Judgment normal.        Lab Results   Component Value Date    WBC 7.70 05/05/2023    HGB 13.7 05/05/2023    HCT 43.1 05/05/2023     05/05/2023    CHOL 179 01/04/2023    TRIG 98 01/04/2023    HDL 37 (L) 01/04/2023    ALT 26 05/05/2023    AST 23 05/05/2023     05/05/2023    K 4.5 05/05/2023     05/05/2023    CREATININE 1.0 05/05/2023    BUN 22 05/05/2023    CO2 28 05/05/2023    TSH 2.413 08/11/2023    HGBA1C 5.5 05/05/2023       Assessment/plan:     Maya Santos is a 77 y.o.female with:    Duodenal erosion-seen on EGD done  -     pantoprazole (PROTONIX) 20 MG tablet; Take 1 tablet (20 mg total) by mouth once daily.  Dispense: 90 tablet; Refill: 1    Gastroesophageal reflux disease, unspecified whether esophagitis present  -     pantoprazole (PROTONIX) 20 MG tablet; Take 1 tablet (20 mg total) by mouth once daily.  Dispense: 90 tablet; Refill: 1    Right thyroid nodule  Comments:  increased in size from 1.3 then 2.9 -biopsy at the end of October    Prediabetes  -     semaglutide (OZEMPIC) 0.25 mg or 0.5 mg (2 mg/3 mL) pen injector; Inject 0.5 mg into the skin every 7 days.  Dispense: 3 mL; Refill: 3    Benign essential hypertension  -     TSH; Future; Expected date: 08/11/2023    Chronic idiopathic constipation   Trying to hold off on Tums with taking the pantoprazole as Tums close constipation and start the natural supplement you got with aloe vera and senna tonight    Other orders  -     (In Office Administered) Pneumococcal Conjugate Vaccine (13 Valent) (IM)        Future Appointments   Date Time Provider Department Center   10/5/2023  1:45 PM [facility] END[facility]  [facility] Rugg[facility]  [facility]

## 2023-08-31 DIAGNOSIS — E78.5 DYSLIPIDEMIA: ICD-10-CM

## 2023-08-31 RX ORDER — ROSUVASTATIN CALCIUM 20 MG/1
20 TABLET, COATED ORAL
Qty: 90 TABLET | Refills: 1 | Status: SHIPPED | OUTPATIENT
Start: 2023-08-31

## 2023-08-31 NOTE — TELEPHONE ENCOUNTER
Care Due:                  Date            Visit Type   Department     Provider  --------------------------------------------------------------------------------                                             [facility]   INTERNAL  Last Visit: 08-      PATIENT      MEDICINE       Jody Couch  Next Visit: None Scheduled  None         None Found                                                            Last  Test          Frequency    Reason                     Performed    Due Date  --------------------------------------------------------------------------------    HBA1C.......  6 months...  semaglutide..............  05- 11-    Health [facility] Embedded Care Due Messages. Reference number: [id number].   8/31/2023 2:17:00 AM CDT

## 2023-08-31 NOTE — TELEPHONE ENCOUNTER
Provider Staff:  Action required for this patient     Please see care gap opportunities below in Care Due Message: Lab (a1c) due 11.1.2023.     Thanks!  [facility]     Appointments     Last Visit   8/11/2023 Jody Couch MD   Next Visit   Visit date not found Jody Couch MD       Refill Decision Note   Maya Santos  is requesting a refill authorization.  Brief Assessment and Rationale for Refill:  Approve       Medication Therapy Plan:  Lab (a1c) due 11.1.2023      Comments:   Note composed:2:01 PM 08/31/2023

## 2023-10-05 ENCOUNTER — HOSPITAL ENCOUNTER (OUTPATIENT)
Dept: ENDOCRINOLOGY | Facility: CLINIC | Age: 78
Discharge: HOME | End: 2023-10-05
Attending: HOSPITALIST
Payer: MEDICARE

## 2023-10-05 ENCOUNTER — CLINICAL SUPPORT (OUTPATIENT)
Dept: INTERNAL MEDICINE | Facility: CLINIC | Age: 78
End: 2023-10-05
Payer: MEDICARE

## 2023-10-05 DIAGNOSIS — Z23 IMMUNIZATION DUE: Primary | ICD-10-CM

## 2023-10-05 DIAGNOSIS — E04.2 MULTINODULAR GOITER: ICD-10-CM

## 2023-10-05 DIAGNOSIS — E04.1 RIGHT THYROID NODULE: ICD-10-CM

## 2023-10-05 PROCEDURE — 88173 CYTOPATH EVAL FNA REPORT: CPT | Performed by: PATHOLOGY

## 2023-10-05 PROCEDURE — 10005 FNA BX W/US GDN 1ST LES: CPT | Mod: ,,, | Performed by: INTERNAL MEDICINE

## 2023-10-05 PROCEDURE — 88173 CYTOPATH EVAL FNA REPORT: CPT | Mod: 26,,, | Performed by: PATHOLOGY

## 2023-10-05 PROCEDURE — G0008 ADMIN INFLUENZA VIRUS VAC: HCPCS | Mod: PBBFAC

## 2023-10-05 PROCEDURE — 99999PBSHW FLU VACCINE - QUADRIVALENT - ADJUVANTED: ICD-10-PCS | Mod: PBBFAC,,,

## 2023-10-05 PROCEDURE — 99999PBSHW FLU VACCINE - QUADRIVALENT - ADJUVANTED: Mod: PBBFAC,,,

## 2023-10-05 PROCEDURE — 88173 PR  INTERPRETATION OF FNA SMEAR: ICD-10-PCS | Mod: 26,,, | Performed by: PATHOLOGY

## 2023-10-05 PROCEDURE — 10005 US FINE NEEDLE ASPIRATION THYROID, FIRST LESION: ICD-10-PCS | Mod: ,,, | Performed by: INTERNAL MEDICINE

## 2023-10-09 LAB
FINAL PATHOLOGIC DIAGNOSIS: NORMAL
Lab: NORMAL

## 2023-12-12 ENCOUNTER — IMMUNIZATION (OUTPATIENT)
Dept: INTERNAL MEDICINE | Facility: CLINIC | Age: 78
End: 2023-12-12
Payer: MEDICARE

## 2023-12-12 DIAGNOSIS — Z23 NEED FOR VACCINATION: Primary | ICD-10-CM

## 2023-12-12 PROCEDURE — 99999PBSHW COVID-19 VAC, MRNA 2023 (MODERNA)(PF) 50 MCG/0.5 ML IM SUSR (12+): Mod: PBBFAC,,,

## 2023-12-12 PROCEDURE — 91322 SARSCOV2 VAC 50 MCG/0.5ML IM: CPT | Mod: PBBFAC

## 2023-12-12 PROCEDURE — 99999PBSHW COVID-19 VAC, MRNA 2023 (MODERNA)(PF) 50 MCG/0.5 ML IM SUSR (12+): ICD-10-PCS | Mod: PBBFAC,,,

## 2023-12-15 DIAGNOSIS — R73.03 PREDIABETES: ICD-10-CM

## 2023-12-15 RX ORDER — SEMAGLUTIDE 0.68 MG/ML
0.5 INJECTION, SOLUTION SUBCUTANEOUS
Qty: 3 EACH | Refills: 3 | Status: SHIPPED | OUTPATIENT
Start: 2023-12-15 | End: 2024-01-23 | Stop reason: SDUPTHER

## 2023-12-15 NOTE — TELEPHONE ENCOUNTER
Refill Routing Note   Medication(s) are not appropriate for processing by [facility] for the following reason(s):        Required labs outdated    [facility] action(s):  Defer   Requires labs : Yes               Appointments  past 12m or future 3m with PCP    Date Provider   Last Visit   8/11/2023 Jody Couch MD   Next Visit   Visit date not found Jody Couch MD   ED visits in past 90 days: 0        Note composed:10:54 AM 12/15/2023

## 2023-12-15 NOTE — TELEPHONE ENCOUNTER
Care Due:                  Date            Visit Type   Department     Provider  --------------------------------------------------------------------------------                                             [facility]   INTERNAL  Last Visit: 08-      PATIENT      MEDICINE       Jody Couch  Next Visit: None Scheduled  None         None Found                                                            Last  Test          Frequency    Reason                     Performed    Due Date  --------------------------------------------------------------------------------    HBA1C.......  6 months...  semaglutide..............  05- 11-    Lipid Panel.  12 months..  rosuvastatin.............  01- 12-    Health [facility] Embedded Care Due Messages. Reference number: [id number].   12/15/2023 12:39:32 AM CST

## 2023-12-30 DIAGNOSIS — I10 BENIGN ESSENTIAL HYPERTENSION: ICD-10-CM

## 2023-12-30 NOTE — TELEPHONE ENCOUNTER
No care due was identified.  Health [facility] Embedded Care Due Messages. Reference number: [id number].   12/30/2023 7:24:21 AM CST

## 2023-12-31 RX ORDER — IRBESARTAN AND HYDROCHLOROTHIAZIDE 150; 12.5 MG/1; MG/1
1 TABLET, FILM COATED ORAL
Qty: 90 TABLET | Refills: 1 | Status: SHIPPED | OUTPATIENT
Start: 2023-12-31

## 2024-01-01 NOTE — TELEPHONE ENCOUNTER
Refill Decision Note   Maya Santos  is requesting a refill authorization.  Brief Assessment and Rationale for Refill:  Approve     Medication Therapy Plan:         Comments:     Note composed:8:21 PM 12/31/2023

## 2024-01-23 DIAGNOSIS — R73.03 PREDIABETES: ICD-10-CM

## 2024-01-23 RX ORDER — SEMAGLUTIDE 0.68 MG/ML
0.5 INJECTION, SOLUTION SUBCUTANEOUS
Qty: 3 EACH | Refills: 3 | Status: SHIPPED | OUTPATIENT
Start: 2024-01-23 | End: 2024-04-17

## 2024-02-15 DIAGNOSIS — K21.9 GASTROESOPHAGEAL REFLUX DISEASE, UNSPECIFIED WHETHER ESOPHAGITIS PRESENT: ICD-10-CM

## 2024-02-15 DIAGNOSIS — K26.9 DUODENAL EROSION: ICD-10-CM

## 2024-02-15 RX ORDER — PANTOPRAZOLE SODIUM 20 MG/1
20 TABLET, DELAYED RELEASE ORAL
Qty: 90 TABLET | Refills: 1 | Status: SHIPPED | OUTPATIENT
Start: 2024-02-15 | End: 2024-04-17

## 2024-02-15 NOTE — TELEPHONE ENCOUNTER
Care Due:                  Date            Visit Type   Department     Provider  --------------------------------------------------------------------------------                                             [facility]   INTERNAL  Last Visit: 08-      PATIENT      MEDICINE       Jody  Khoa  Next Visit: None Scheduled  None         None Found                                                            Last  Test          Frequency    Reason                     Performed    Due Date  --------------------------------------------------------------------------------    CMP.........  12 months..  irbesartan-hydrochlorothi  05- 04-                             azide, rosuvastatin......    HBA1C.......  6 months...  semaglutide..............  05- 11-    Lipid Panel.  12 months..  rosuvastatin.............  01- 12-    Health [facility] Embedded Care Due Messages. Reference number: [id number].   2/15/2024 12:34:32 AM CST

## 2024-02-16 NOTE — TELEPHONE ENCOUNTER
Provider Staff:  Action required for this patient    Requires labs      Please see care gap opportunities below in Care Due Message.    Thanks!  [facility]     Appointments      Date Provider   Last Visit   8/11/2023 Jody Couch MD   Next Visit   Visit date not found Jody Couch MD     Refill Decision Note   Maya Santos  is requesting a refill authorization.  Brief Assessment and Rationale for Refill:  Approve     Medication Therapy Plan:         Comments:     Note composed:10:01 PM 02/15/2024

## 2024-02-27 DIAGNOSIS — Z00.00 ENCOUNTER FOR MEDICARE ANNUAL WELLNESS EXAM: ICD-10-CM

## 2024-04-11 ENCOUNTER — OFFICE VISIT (OUTPATIENT)
Dept: OPTOMETRY | Facility: CLINIC | Age: 79
End: 2024-04-11
Payer: MEDICARE

## 2024-04-11 DIAGNOSIS — H25.13 SENILE NUCLEAR CATARACT, BILATERAL: Primary | ICD-10-CM

## 2024-04-11 DIAGNOSIS — H52.4 HYPEROPIA OF BOTH EYES WITH ASTIGMATISM AND PRESBYOPIA: ICD-10-CM

## 2024-04-11 DIAGNOSIS — H52.203 HYPEROPIA OF BOTH EYES WITH ASTIGMATISM AND PRESBYOPIA: ICD-10-CM

## 2024-04-11 DIAGNOSIS — H52.03 HYPEROPIA OF BOTH EYES WITH ASTIGMATISM AND PRESBYOPIA: ICD-10-CM

## 2024-04-11 PROCEDURE — 99999 PR PBB SHADOW E&M-EST. PATIENT-LVL III: CPT | Mod: PBBFAC,,, | Performed by: OPTOMETRIST

## 2024-04-11 PROCEDURE — 99204 OFFICE O/P NEW MOD 45 MIN: CPT | Mod: S$PBB,,, | Performed by: OPTOMETRIST

## 2024-04-11 PROCEDURE — 99213 OFFICE O/P EST LOW 20 MIN: CPT | Mod: PBBFAC | Performed by: OPTOMETRIST

## 2024-04-17 ENCOUNTER — OFFICE VISIT (OUTPATIENT)
Dept: INTERNAL MEDICINE | Facility: CLINIC | Age: 79
End: 2024-04-17
Payer: MEDICARE

## 2024-04-17 ENCOUNTER — CLINICAL SUPPORT (OUTPATIENT)
Dept: INTERNAL MEDICINE | Facility: CLINIC | Age: 79
End: 2024-04-17
Payer: MEDICARE

## 2024-04-17 DIAGNOSIS — E04.2 MULTIPLE THYROID NODULES: ICD-10-CM

## 2024-04-17 DIAGNOSIS — Z00.00 ANNUAL PHYSICAL EXAM: Primary | ICD-10-CM

## 2024-04-17 DIAGNOSIS — I44.7 LBBB (LEFT BUNDLE BRANCH BLOCK): ICD-10-CM

## 2024-04-17 DIAGNOSIS — R73.03 PREDIABETES: ICD-10-CM

## 2024-04-17 DIAGNOSIS — M85.80 SENILE OSTEOPENIA: ICD-10-CM

## 2024-04-17 DIAGNOSIS — K21.9 GASTROESOPHAGEAL REFLUX DISEASE, UNSPECIFIED WHETHER ESOPHAGITIS PRESENT: ICD-10-CM

## 2024-04-17 DIAGNOSIS — E78.5 DYSLIPIDEMIA: ICD-10-CM

## 2024-04-17 DIAGNOSIS — Z00.00 ANNUAL PHYSICAL EXAM: ICD-10-CM

## 2024-04-17 DIAGNOSIS — Z12.31 ENCOUNTER FOR SCREENING MAMMOGRAM FOR MALIGNANT NEOPLASM OF BREAST: ICD-10-CM

## 2024-04-17 DIAGNOSIS — I10 BENIGN ESSENTIAL HYPERTENSION: Primary | ICD-10-CM

## 2024-04-17 DIAGNOSIS — K59.04 CHRONIC IDIOPATHIC CONSTIPATION: ICD-10-CM

## 2024-04-17 DIAGNOSIS — R79.9 ABNORMAL FINDING OF BLOOD CHEMISTRY, UNSPECIFIED: ICD-10-CM

## 2024-04-17 LAB
ALBUMIN SERPL BCP-MCNC: 4 G/DL (ref 3.5–5.2)
ALP SERPL-CCNC: 64 U/L (ref 55–135)
ALT SERPL W/O P-5'-P-CCNC: 20 U/L (ref 10–44)
ANION GAP SERPL CALC-SCNC: 8 MMOL/L (ref 8–16)
AST SERPL-CCNC: 21 U/L (ref 10–40)
BACTERIA #/AREA URNS AUTO: NORMAL /HPF
BASOPHILS # BLD AUTO: 0.07 K/UL (ref 0–0.2)
BASOPHILS NFR BLD: 1 % (ref 0–1.9)
BILIRUB SERPL-MCNC: 0.9 MG/DL (ref 0.1–1)
BILIRUB UR QL STRIP: NEGATIVE
BUN SERPL-MCNC: 25 MG/DL (ref 8–23)
CALCIUM SERPL-MCNC: 9.8 MG/DL (ref 8.7–10.5)
CHLORIDE SERPL-SCNC: 103 MMOL/L (ref 95–110)
CHOLEST SERPL-MCNC: 133 MG/DL (ref 120–199)
CHOLEST/HDLC SERPL: 3.9 {RATIO} (ref 2–5)
CLARITY UR REFRACT.AUTO: CLEAR
CO2 SERPL-SCNC: 27 MMOL/L (ref 23–29)
COLOR UR AUTO: YELLOW
CREAT SERPL-MCNC: 0.9 MG/DL (ref 0.5–1.4)
DIFFERENTIAL METHOD BLD: NORMAL
EOSINOPHIL # BLD AUTO: 0.3 K/UL (ref 0–0.5)
EOSINOPHIL NFR BLD: 4.5 % (ref 0–8)
ERYTHROCYTE [DISTWIDTH] IN BLOOD BY AUTOMATED COUNT: 12.4 % (ref 11.5–14.5)
EST. GFR  (NO RACE VARIABLE): >60 ML/MIN/1.73 M^2
ESTIMATED AVG GLUCOSE: 108 MG/DL (ref 68–131)
GLUCOSE SERPL-MCNC: 86 MG/DL (ref 70–110)
GLUCOSE UR QL STRIP: NEGATIVE
HBA1C MFR BLD: 5.4 % (ref 4–5.6)
HCT VFR BLD AUTO: 43 % (ref 37–48.5)
HDLC SERPL-MCNC: 34 MG/DL (ref 40–75)
HDLC SERPL: 25.6 % (ref 20–50)
HGB BLD-MCNC: 14.2 G/DL (ref 12–16)
HGB UR QL STRIP: NEGATIVE
IMM GRANULOCYTES # BLD AUTO: 0.03 K/UL (ref 0–0.04)
IMM GRANULOCYTES NFR BLD AUTO: 0.4 % (ref 0–0.5)
KETONES UR QL STRIP: NEGATIVE
LDLC SERPL CALC-MCNC: 82.6 MG/DL (ref 63–159)
LEUKOCYTE ESTERASE UR QL STRIP: ABNORMAL
LYMPHOCYTES # BLD AUTO: 1.8 K/UL (ref 1–4.8)
LYMPHOCYTES NFR BLD: 27.2 % (ref 18–48)
MCH RBC QN AUTO: 29.6 PG (ref 27–31)
MCHC RBC AUTO-ENTMCNC: 33 G/DL (ref 32–36)
MCV RBC AUTO: 90 FL (ref 82–98)
MICROSCOPIC COMMENT: NORMAL
MONOCYTES # BLD AUTO: 0.6 K/UL (ref 0.3–1)
MONOCYTES NFR BLD: 9 % (ref 4–15)
NEUTROPHILS # BLD AUTO: 3.9 K/UL (ref 1.8–7.7)
NEUTROPHILS NFR BLD: 57.9 % (ref 38–73)
NITRITE UR QL STRIP: NEGATIVE
NONHDLC SERPL-MCNC: 99 MG/DL
NRBC BLD-RTO: 0 /100 WBC
PH UR STRIP: 6 [PH] (ref 5–8)
PLATELET # BLD AUTO: 223 K/UL (ref 150–450)
PMV BLD AUTO: 10.8 FL (ref 9.2–12.9)
POTASSIUM SERPL-SCNC: 4.1 MMOL/L (ref 3.5–5.1)
PROT SERPL-MCNC: 7 G/DL (ref 6–8.4)
PROT UR QL STRIP: NEGATIVE
RBC # BLD AUTO: 4.79 M/UL (ref 4–5.4)
RBC #/AREA URNS AUTO: 1 /HPF (ref 0–4)
SODIUM SERPL-SCNC: 138 MMOL/L (ref 136–145)
SP GR UR STRIP: 1.02 (ref 1–1.03)
SQUAMOUS #/AREA URNS AUTO: 2 /HPF
TRIGL SERPL-MCNC: 82 MG/DL (ref 30–150)
TSH SERPL DL<=0.005 MIU/L-ACNC: 3.23 UIU/ML (ref 0.4–4)
URN SPEC COLLECT METH UR: ABNORMAL
WBC # BLD AUTO: 6.7 K/UL (ref 3.9–12.7)
WBC #/AREA URNS AUTO: 1 /HPF (ref 0–5)

## 2024-04-17 PROCEDURE — 36415 COLL VENOUS BLD VENIPUNCTURE: CPT | Performed by: INTERNAL MEDICINE

## 2024-04-17 PROCEDURE — 80053 COMPREHEN METABOLIC PANEL: CPT | Performed by: INTERNAL MEDICINE

## 2024-04-17 PROCEDURE — 83036 HEMOGLOBIN GLYCOSYLATED A1C: CPT | Performed by: INTERNAL MEDICINE

## 2024-04-17 PROCEDURE — 99213 OFFICE O/P EST LOW 20 MIN: CPT | Mod: PBBFAC | Performed by: INTERNAL MEDICINE

## 2024-04-17 PROCEDURE — 99999 PR PBB SHADOW E&M-EST. PATIENT-LVL III: CPT | Mod: PBBFAC,,, | Performed by: INTERNAL MEDICINE

## 2024-04-17 PROCEDURE — 81001 URINALYSIS AUTO W/SCOPE: CPT | Performed by: INTERNAL MEDICINE

## 2024-04-17 PROCEDURE — 84443 ASSAY THYROID STIM HORMONE: CPT | Performed by: INTERNAL MEDICINE

## 2024-04-17 PROCEDURE — 99214 OFFICE O/P EST MOD 30 MIN: CPT | Mod: S$PBB,,, | Performed by: INTERNAL MEDICINE

## 2024-04-17 PROCEDURE — 80061 LIPID PANEL: CPT | Performed by: INTERNAL MEDICINE

## 2024-04-17 PROCEDURE — 85025 COMPLETE CBC W/AUTO DIFF WBC: CPT | Performed by: INTERNAL MEDICINE

## 2024-04-17 NOTE — PROGRESS NOTES
HPI    Last eye exam: 1-2 years ago    78 y.o. female presents for annual eye exam. Denies problems with vision.   Wear OTC readers, +3.00. Denies glare and trouble driving at night. Denies   eye pain, headaches, flashes, floaters. No double vision, irritation, or   tearing. No hx of eye surgeries.    Meds:  none  Last edited by Genoveva Baeza on 4/11/2024 11:24 AM.            Assessment /Plan     For exam results, see Encounter Report.    Senile nuclear cataract, bilateral    Hyperopia of both eyes with astigmatism and presbyopia      MONITOR. ED PT ON ALL EXAM FINDINGS  RX FINAL SPECS   MILD NS OU; UV PROTECTION; MONITOR. PRESURGICAL  RTC 1 YR//PRN FOR REE/DFE

## 2024-04-18 ENCOUNTER — TELEPHONE (OUTPATIENT)
Dept: INTERNAL MEDICINE | Facility: CLINIC | Age: 79
End: 2024-04-18
Payer: MEDICARE

## 2024-04-18 VITALS
BODY MASS INDEX: 30.05 KG/M2 | HEIGHT: 66 IN | SYSTOLIC BLOOD PRESSURE: 120 MMHG | DIASTOLIC BLOOD PRESSURE: 80 MMHG | OXYGEN SATURATION: 98 % | WEIGHT: 187 LBS | HEART RATE: 61 BPM

## 2024-04-18 NOTE — TELEPHONE ENCOUNTER
Patient's blood pressure was elevated yesterday and looking back in her history she does have a history of a left bundle branch block.  I can not find an echocardiogram on her record can we call her and let her know that I would like to get an echocardiogram of the heart.  Also let us schedule her for a virtual visit 1 afternoon in about 3 weeks to go over her blood pressure log.  Remember virtual visits our best on Thursday

## 2024-04-18 NOTE — PROGRESS NOTES
Subjective:       Patient ID: Maya Santos is a 78 y.o. female who presents today for:    Chief Complaint:   Chief Complaint   Patient presents with    Annual Exam     Has been well       HPI:  Very pleasant 78-year-old nonsmoking female with a past medical history significant for multiple thyroid nodules, essential hypertension, essential dyslipidemia, left bundle branch block, osteopenia with prior vitamin-D deficiency, insulin resistance and GERD who is here for an annual physical exam.  Today her blood pressure is elevated.  She has not checked it in awhile because it has been very goes since she has had a 14-15 lb weight loss over the past year semaglutide.  Last month it was about 120/80.  She denies any symptoms of headache or dyspnea on exertion.  She is however complaining of constipation despite taking stool softeners.  Patient continues to exercise regularly with a  and enjoys walking as well.  She denies frequent symptoms of reflux and if she has any issue she will take a Tums.      Review of Systems   Constitutional:  Negative for chills, fever and weight loss.   HENT:  Negative for sore throat.    Eyes:  Negative for blurred vision and double vision.   Respiratory:  Negative for cough and shortness of breath.    Cardiovascular:  Negative for chest pain and palpitations.   Gastrointestinal:  Positive for constipation. Negative for diarrhea, nausea and vomiting.   Genitourinary:  Negative for dysuria and hematuria.   Musculoskeletal:  Negative for joint pain and myalgias.   Skin:  Negative for itching and rash.   Neurological:  Negative for sensory change, focal weakness and headaches.   Endo/Heme/Allergies:  Does not bruise/bleed easily.   Psychiatric/Behavioral:  Negative for depression and suicidal ideas.         Medications:  Outpatient Encounter Medications as of 4/17/2024   Medication Sig Dispense Refill    irbesartan-hydrochlorothiazide (AVALIDE) 150-12.5 mg per tablet TAKE 1  TABLET BY MOUTH EVERY DAY 90 tablet 1    omega-3 fatty acids/fish oil (FISH OIL-OMEGA-3 FATTY ACIDS) 300-1,000 mg capsule Take by mouth once daily.      rosuvastatin (CRESTOR) 20 MG tablet TAKE 1 TABLET BY MOUTH EVERY DAY 90 tablet 1    tirzepatide 7.5 mg/0.5 mL PnIj Inject 7.5 mg into the skin every 7 days. 4 Pen 3    UNABLE TO FIND Circulation & Vein Support (Patient not taking: Reported on 4/11/2024)      UNABLE TO FIND Essential One Multi vit (Patient not taking: Reported on 4/11/2024)      UNABLE TO FIND medication name:marine collagen (Patient not taking: Reported on 4/11/2024)      vitamin D (VITAMIN D3) 1000 units Tab Take 1,000 Units by mouth once daily.      [DISCONTINUED] LORazepam (ATIVAN) 0.5 MG tablet 1- 2 prior to procedure (Patient not taking: Reported on 8/11/2023) 15 tablet 0    [DISCONTINUED] pantoprazole (PROTONIX) 20 MG tablet TAKE 1 TABLET BY MOUTH EVERY DAY (Patient not taking: Reported on 4/11/2024) 90 tablet 1    [DISCONTINUED] semaglutide (OZEMPIC) 0.25 mg or 0.5 mg (2 mg/3 mL) pen injector Inject 0.5 mg into the skin every 7 days. 3 each 3     No facility-administered encounter medications on file as of 4/17/2024.       Allergies:  Review of patient's allergies indicates:  No Known Allergies    Health Maintenance:  Immunization History   Administered Date(s) Administered    COVID-19, MRNA, LN-S, PF (Pfizer) (Purple Cap) 01/07/2021, 01/28/2021, 09/28/2021    COVID-19, mRNA, LNP-S, PF (Moderna 2023)Ages 12+ 12/12/2023    COVID-19, mRNA, LNP-S, bivalent booster, PF (PFIZER OMICRON) 10/25/2022    DT (Pediatric) 05/20/2005    Influenza 10/02/2009, 10/29/2011, 11/15/2014, 10/31/2015, 10/21/2020    Influenza (FLUAD) - Quadrivalent - Adjuvanted - PF *Preferred* (65+) 10/12/2022, 10/05/2023    Influenza - High Dose - PF (65 years and older) 10/26/2013, 11/02/2016, 10/19/2017, 09/11/2018, 10/08/2019    Influenza - Trivalent (ADULT) 10/02/2009, 10/29/2011    Influenza A (H1N1) 2009 Monovalent - IM  "11/21/2009    Pneumococcal Conjugate - 13 Valent 08/11/2023    Pneumococcal Polysaccharide - 23 Valent 11/28/2012, 07/12/2013    RSVpreF (Arexvy) 12/12/2023    Td (ADULT) 05/20/2005    Td - PF (ADULT) 05/20/2005    Tdap 07/12/2013, 11/23/2022    Zoster 09/12/2014    Zoster Recombinant 01/04/2023, 05/05/2023      Health Maintenance   Topic Date Due    DEXA Scan  02/06/2026    Lipid Panel  04/17/2029    TETANUS VACCINE  11/23/2032    Hepatitis C Screening  Completed    Shingles Vaccine  Completed          Objective:      Vital Signs  Pulse: 61  SpO2: 98 %  BP: 120/80 (At home last month)  Pain Score: 0-No pain  Height and Weight  Height: 5' 6" (167.6 cm)  Weight: 84.8 kg (187 lb)  BSA (Calculated - sq m): 1.99 sq meters  BMI (Calculated): 30.2  Weight in (lb) to have BMI = 25: 154.6]    Physical Exam  HENT:      Head: Normocephalic and atraumatic.   Eyes:      General: No scleral icterus.  Cardiovascular:      Rate and Rhythm: Normal rate and regular rhythm.      Heart sounds: No murmur heard.  Pulmonary:      Effort: Pulmonary effort is normal.      Breath sounds: Normal breath sounds. No rales.   Abdominal:      General: Bowel sounds are normal.      Palpations: Abdomen is soft.      Tenderness: There is no abdominal tenderness.   Musculoskeletal:         General: No tenderness.      Cervical back: Neck supple.   Neurological:      Mental Status: She is alert and oriented to person, place, and time.   Psychiatric:         Mood and Affect: Mood normal.         Behavior: Behavior normal.         Thought Content: Thought content normal.         Judgment: Judgment normal.        Lab Results   Component Value Date    WBC 6.70 04/17/2024    HGB 14.2 04/17/2024    HCT 43.0 04/17/2024     04/17/2024    CHOL 133 04/17/2024    TRIG 82 04/17/2024    HDL 34 (L) 04/17/2024    ALT 20 04/17/2024    AST 21 04/17/2024     04/17/2024    K 4.1 04/17/2024     04/17/2024    CREATININE 0.9 04/17/2024    BUN 25 (H) " 04/17/2024    CO2 27 04/17/2024    TSH 3.231 04/17/2024    HGBA1C 5.4 04/17/2024     Assessment/plan:     Maya Santos is a 78 y.o.female here for an annual physical exam.    Benign essential hypertension  -     patient will monitor at home and keep a log and get back with me as we can easily go up on the irbesartan to 300 mg.    LBBB (left bundle branch block)- mitral valve regurgitation?  I do not have an old echocardiogram and only 1 EKG in Care everywhere showing the left bundle branch block.  -     Echo;     Chronic idiopathic constipation   Due to GLP 1 agonist-recommended 1st try MiraLax 1 capful daily at the same time every day bend if no relief for incomplete relief add senna supplement at night 1-2 tablets nightly.  Did briefly discuss promotility agents but will defer for now with the above conservative recommendations.    Dyslipidemia   With low HDL; continue rosuvastatin 20 mg, fish oil and weight loss.  Goal LDL less than 100    Multiple thyroid nodules-negative FNA in October of 2023.  -     US Soft Tissue Head Neck; Future    Senile osteopenia-status post right wrist fracture in 2019.   Continue with resistance exercise, adequate calcium and vitamin-D and recheck bone mineral density next February    Encounter for screening mammogram for malignant neoplasm of breast  -     Mammo Digital Screening Bilat; Future; Expected date: 04/17/2024    Gastroesophageal reflux disease, unspecified whether esophagitis present   Not on PPI; asymptomatic    Prediabetes   A1c last year 5.9% in Care everywhere.  Patient has lost 15 lb in 1 year on semaglutide 0.5 mg weekly.   Will change semaglutide to compounded Mounjaro   -     tirzepatide 7.5 mg/0.5 mL PnIj; Inject 7.5 mg into the skin every 7 days.  Dispense: 4 Pen; Refill: 3        Future Appointments   Date Time Provider Department Center   4/30/2024  2:15 PM [facility] USOP2 [facility] USOUNDO [facility]   4/30/2024  3:00 PM [facility] MAMMO1 [facility] MAMMO [facility]        Jody Ruggiero MD  [facility]

## 2024-04-29 ENCOUNTER — HOSPITAL ENCOUNTER (OUTPATIENT)
Dept: CARDIOLOGY | Facility: HOSPITAL | Age: 79
Discharge: HOME | End: 2024-04-29
Attending: INTERNAL MEDICINE
Payer: MEDICARE

## 2024-04-29 VITALS
HEIGHT: 66 IN | DIASTOLIC BLOOD PRESSURE: 74 MMHG | WEIGHT: 187 LBS | SYSTOLIC BLOOD PRESSURE: 124 MMHG | HEART RATE: 76 BPM | BODY MASS INDEX: 30.05 KG/M2

## 2024-04-29 DIAGNOSIS — I10 BENIGN ESSENTIAL HYPERTENSION: ICD-10-CM

## 2024-04-29 DIAGNOSIS — I44.7 LBBB (LEFT BUNDLE BRANCH BLOCK): ICD-10-CM

## 2024-04-29 LAB
ASCENDING AORTA: 3.56 CM
AV INDEX (PROSTH): 0.71
AV MEAN GRADIENT: 5 MMHG
AV PEAK GRADIENT: 10 MMHG
AV VALVE AREA BY VELOCITY RATIO: 3.16 CM²
AV VALVE AREA: 3.01 CM²
AV VELOCITY RATIO: 0.74
BSA FOR ECHO PROCEDURE: 1.99 M2
CV ECHO LV RWT: 0.25 CM
DOP CALC AO PEAK VEL: 1.58 M/S
DOP CALC AO VTI: 33.02 CM
DOP CALC LVOT AREA: 4.3 CM2
DOP CALC LVOT DIAMETER: 2.33 CM
DOP CALC LVOT PEAK VEL: 1.17 M/S
DOP CALC LVOT STROKE VOLUME: 99.51 CM3
DOP CALCLVOT PEAK VEL VTI: 23.35 CM
E WAVE DECELERATION TIME: 240.51 MSEC
E/A RATIO: 0.61
E/E' RATIO: 13.5 M/S
ECHO LV POSTERIOR WALL: 0.79 CM (ref 0.6–1.1)
EJECTION FRACTION: 53 %
FRACTIONAL SHORTENING: 29 % (ref 28–44)
INTERVENTRICULAR SEPTUM: 0.82 CM (ref 0.6–1.1)
IVRT: 125.59 MSEC
LA MAJOR: 6.08 CM
LA MINOR: 6.04 CM
LA WIDTH: 4.74 CM
LEFT ATRIUM SIZE: 5.1 CM
LEFT ATRIUM VOLUME INDEX MOD: 42.1 ML/M2
LEFT ATRIUM VOLUME INDEX: 64.2 ML/M2
LEFT ATRIUM VOLUME MOD: 81.76 CM3
LEFT ATRIUM VOLUME: 124.52 CM3
LEFT INTERNAL DIMENSION IN SYSTOLE: 4.46 CM (ref 2.1–4)
LEFT VENTRICLE DIASTOLIC VOLUME INDEX: 103.72 ML/M2
LEFT VENTRICLE DIASTOLIC VOLUME: 201.22 ML
LEFT VENTRICLE MASS INDEX: 105 G/M2
LEFT VENTRICLE SYSTOLIC VOLUME INDEX: 46.6 ML/M2
LEFT VENTRICLE SYSTOLIC VOLUME: 90.42 ML
LEFT VENTRICULAR INTERNAL DIMENSION IN DIASTOLE: 6.3 CM (ref 3.5–6)
LEFT VENTRICULAR MASS: 204.33 G
LV LATERAL E/E' RATIO: 10.8 M/S
LV SEPTAL E/E' RATIO: 18 M/S
MV A" WAVE DURATION": 10.66 MSEC
MV PEAK A VEL: 0.88 M/S
MV PEAK E VEL: 0.54 M/S
MV STENOSIS PRESSURE HALF TIME: 69.75 MS
MV VALVE AREA P 1/2 METHOD: 3.15 CM2
OHS CV RV/LV RATIO: 0.79 CM
PISA TR MAX VEL: 2.64 M/S
PULM VEIN S/D RATIO: 1.22
PV PEAK D VEL: 0.49 M/S
PV PEAK S VEL: 0.6 M/S
RA MAJOR: 5.68 CM
RA PRESSURE ESTIMATED: 3 MMHG
RA WIDTH: 4.25 CM
RIGHT VENTRICULAR END-DIASTOLIC DIMENSION: 4.95 CM
RV TB RVSP: 6 MMHG
SINUS: 3.7 CM
STJ: 2.88 CM
TDI LATERAL: 0.05 M/S
TDI SEPTAL: 0.03 M/S
TDI: 0.04 M/S
TR MAX PG: 28 MMHG
TRICUSPID ANNULAR PLANE SYSTOLIC EXCURSION: 2.63 CM
TV REST PULMONARY ARTERY PRESSURE: 31 MMHG
Z-SCORE OF LEFT VENTRICULAR DIMENSION IN END DIASTOLE: 1.34
Z-SCORE OF LEFT VENTRICULAR DIMENSION IN END SYSTOLE: 2.17

## 2024-04-29 PROCEDURE — 93306 TTE W/DOPPLER COMPLETE: CPT | Mod: 26,,, | Performed by: INTERNAL MEDICINE

## 2024-04-29 PROCEDURE — 93306 TTE W/DOPPLER COMPLETE: CPT

## 2024-04-30 ENCOUNTER — HOSPITAL ENCOUNTER (OUTPATIENT)
Dept: RADIOLOGY | Facility: OTHER | Age: 79
Discharge: HOME | End: 2024-04-30
Attending: INTERNAL MEDICINE
Payer: MEDICARE

## 2024-04-30 DIAGNOSIS — E04.2 MULTIPLE THYROID NODULES: ICD-10-CM

## 2024-04-30 DIAGNOSIS — Z12.31 ENCOUNTER FOR SCREENING MAMMOGRAM FOR MALIGNANT NEOPLASM OF BREAST: ICD-10-CM

## 2024-04-30 PROCEDURE — 77063 BREAST TOMOSYNTHESIS BI: CPT | Mod: TC

## 2024-04-30 PROCEDURE — 77063 BREAST TOMOSYNTHESIS BI: CPT | Mod: 26,,, | Performed by: RADIOLOGY

## 2024-04-30 PROCEDURE — 76536 US EXAM OF HEAD AND NECK: CPT | Mod: TC

## 2024-04-30 PROCEDURE — 76536 US EXAM OF HEAD AND NECK: CPT | Mod: 26,,, | Performed by: RADIOLOGY

## 2024-04-30 PROCEDURE — 77067 SCR MAMMO BI INCL CAD: CPT | Mod: 26,,, | Performed by: RADIOLOGY

## 2024-05-08 ENCOUNTER — HOSPITAL ENCOUNTER (OUTPATIENT)
Dept: CARDIOLOGY | Facility: CLINIC | Age: 79
Discharge: HOME | End: 2024-05-08
Payer: MEDICARE

## 2024-05-08 ENCOUNTER — OFFICE VISIT (OUTPATIENT)
Dept: INTERNAL MEDICINE | Facility: CLINIC | Age: 79
End: 2024-05-08
Payer: MEDICARE

## 2024-05-08 VITALS
SYSTOLIC BLOOD PRESSURE: 136 MMHG | HEIGHT: 66 IN | BODY MASS INDEX: 29.89 KG/M2 | DIASTOLIC BLOOD PRESSURE: 84 MMHG | HEART RATE: 64 BPM | WEIGHT: 186 LBS | OXYGEN SATURATION: 99 %

## 2024-05-08 DIAGNOSIS — I10 BENIGN ESSENTIAL HYPERTENSION: ICD-10-CM

## 2024-05-08 DIAGNOSIS — K59.04 CHRONIC IDIOPATHIC CONSTIPATION: ICD-10-CM

## 2024-05-08 DIAGNOSIS — I44.7 LBBB (LEFT BUNDLE BRANCH BLOCK): ICD-10-CM

## 2024-05-08 DIAGNOSIS — M85.80 SENILE OSTEOPENIA: ICD-10-CM

## 2024-05-08 DIAGNOSIS — I10 BENIGN ESSENTIAL HYPERTENSION: Primary | ICD-10-CM

## 2024-05-08 DIAGNOSIS — E04.1 RIGHT THYROID NODULE: ICD-10-CM

## 2024-05-08 LAB
OHS QRS DURATION: 136 MS
OHS QTC CALCULATION: 418 MS

## 2024-05-08 PROCEDURE — 93005 ELECTROCARDIOGRAM TRACING: CPT | Mod: PBBFAC | Performed by: INTERNAL MEDICINE

## 2024-05-08 PROCEDURE — 99214 OFFICE O/P EST MOD 30 MIN: CPT | Mod: S$PBB,,, | Performed by: INTERNAL MEDICINE

## 2024-05-08 PROCEDURE — 93010 ELECTROCARDIOGRAM REPORT: CPT | Mod: S$PBB,,, | Performed by: INTERNAL MEDICINE

## 2024-05-08 PROCEDURE — 99213 OFFICE O/P EST LOW 20 MIN: CPT | Mod: PBBFAC,25 | Performed by: INTERNAL MEDICINE

## 2024-05-08 PROCEDURE — 99999 PR PBB SHADOW E&M-EST. PATIENT-LVL III: CPT | Mod: PBBFAC,,, | Performed by: INTERNAL MEDICINE

## 2024-05-08 RX ORDER — IRBESARTAN AND HYDROCHLOROTHIAZIDE 300; 12.5 MG/1; MG/1
1 TABLET, FILM COATED ORAL DAILY
Qty: 90 TABLET | Refills: 3 | Status: SHIPPED | OUTPATIENT
Start: 2024-05-08 | End: 2025-05-08

## 2024-05-09 NOTE — PROGRESS NOTES
Subjective:       Patient ID: Maya Santos is a 78 y.o. female who presents today for:    Chief Complaint:   Chief Complaint   Patient presents with    Follow-up     Record of BP's       HPI:  Very pleasant 78-year-old nonsmoking female here for follow-up a couple of weeks after her annual visit where her blood pressure was found to be elevated.  She has been checking at home and in his ranged anywhere from 130 systolic to 140 over 70-85 diastolic.  She is using the wrist blood pressure monitor.  We also did review her echocardiogram where she has severe left atrial enlargement moderate to severe right atrial enlargement as well as a pulmonary artery pressure of 31 and moderate tricuspid regurgitation.  Ejection fraction might have been slightly decreased at 50-55%.  We also reviewed her thyroid ultrasound that showed the right mid thyroid nodule to increase in size by a half a cm.  She had a fine-needle aspiration last year with benign pathology.    Review of Systems   Constitutional:  Negative for chills, fever and weight loss.   HENT:  Negative for sore throat.    Eyes:  Negative for blurred vision and double vision.   Respiratory:  Negative for cough and shortness of breath.    Cardiovascular:  Negative for chest pain and palpitations.   Gastrointestinal:  Positive for constipation. Negative for diarrhea, nausea and vomiting.   Genitourinary:  Negative for dysuria and hematuria.   Musculoskeletal:  Negative for joint pain and myalgias.   Skin:  Negative for itching and rash.   Neurological:  Negative for sensory change, focal weakness and headaches.   Endo/Heme/Allergies:  Does not bruise/bleed easily.   Psychiatric/Behavioral:  Negative for depression and suicidal ideas.         Medications:  Outpatient Encounter Medications as of 5/8/2024   Medication Sig Dispense Refill    irbesartan-hydrochlorothiazide (AVALIDE) 300-12.5 mg per tablet Take 1 tablet by mouth once daily. 90 tablet 3    omega-3 fatty  acids/fish oil (FISH OIL-OMEGA-3 FATTY ACIDS) 300-1,000 mg capsule Take by mouth once daily.      rosuvastatin (CRESTOR) 20 MG tablet TAKE 1 TABLET BY MOUTH EVERY DAY 90 tablet 1    tirzepatide 7.5 mg/0.5 mL PnIj Inject 7.5 mg into the skin every 7 days. 4 Pen 3    UNABLE TO FIND Circulation & Vein Support (Patient not taking: Reported on 4/11/2024)      UNABLE TO FIND Essential One Multi vit (Patient not taking: Reported on 4/11/2024)      UNABLE TO FIND medication name:marine collagen (Patient not taking: Reported on 4/11/2024)      vitamin D (VITAMIN D3) 1000 units Tab Take 1,000 Units by mouth once daily.      [DISCONTINUED] irbesartan-hydrochlorothiazide (AVALIDE) 150-12.5 mg per tablet TAKE 1 TABLET BY MOUTH EVERY DAY 90 tablet 1     No facility-administered encounter medications on file as of 5/8/2024.       Allergies:  Review of patient's allergies indicates:  No Known Allergies    Health Maintenance:  Immunization History   Administered Date(s) Administered    COVID-19, MRNA, LN-S, PF (Pfizer) (Purple Cap) 01/07/2021, 01/28/2021, 09/28/2021    COVID-19, mRNA, LNP-S, PF (Moderna 2023)Ages 12+ 12/12/2023    COVID-19, mRNA, LNP-S, bivalent booster, PF (PFIZER OMICRON) 10/25/2022    DT (Pediatric) 05/20/2005    Influenza 10/02/2009, 10/29/2011, 11/15/2014, 10/31/2015, 10/21/2020    Influenza (FLUAD) - Quadrivalent - Adjuvanted - PF *Preferred* (65+) 10/12/2022, 10/05/2023    Influenza - High Dose - PF (65 years and older) 10/26/2013, 11/02/2016, 10/19/2017, 09/11/2018, 10/08/2019    Influenza - Trivalent (ADULT) 10/02/2009, 10/29/2011    Influenza A (H1N1) 2009 Monovalent - IM 11/21/2009    Pneumococcal Conjugate - 13 Valent 08/11/2023    Pneumococcal Polysaccharide - 23 Valent 11/28/2012, 07/12/2013    RSVpreF (Arexvy) 12/12/2023    Td (ADULT) 05/20/2005    Td - PF (ADULT) 05/20/2005    Tdap 07/12/2013, 11/23/2022    Zoster 09/12/2014    Zoster Recombinant 01/04/2023, 05/05/2023      Health Maintenance   Topic  "Date Due    DEXA Scan  02/06/2026    Lipid Panel  04/17/2029    TETANUS VACCINE  11/23/2032    Hepatitis C Screening  Completed    Shingles Vaccine  Completed          Objective:      Vital Signs  Pulse: 64  SpO2: 99 %  BP: 136/84  BP Location: Left arm  Pain Score: 0-No pain  Height and Weight  Height: 5' 6" (167.6 cm)  Weight: 84.4 kg (186 lb)  BSA (Calculated - sq m): 1.98 sq meters  BMI (Calculated): 30  Weight in (lb) to have BMI = 25: 154.6]    Physical Exam  HENT:      Head: Normocephalic and atraumatic.   Eyes:      General: No scleral icterus.  Cardiovascular:      Rate and Rhythm: Normal rate and regular rhythm.      Heart sounds: No murmur heard.  Pulmonary:      Effort: Pulmonary effort is normal.      Breath sounds: Normal breath sounds. No rales.   Musculoskeletal:         General: No tenderness.      Cervical back: Neck supple.   Neurological:      Mental Status: She is alert and oriented to person, place, and time.   Psychiatric:         Mood and Affect: Mood normal.         Behavior: Behavior normal.         Thought Content: Thought content normal.         Judgment: Judgment normal.          Assessment/plan:     aMya Santos is a 78 y.o.female with:    Benign essential hypertension  -     SCHEDULED EKG 12-LEAD (to Muse); Future  -     irbesartan-hydrochlorothiazide (AVALIDE) 300-12.5 mg per tablet; Take 1 tablet by mouth once daily.  Dispense: 90 tablet; Refill: 3    LBBB (left bundle branch block)- mitral valve regurg  -     SCHEDULED EKG 12-LEAD (to Muse); Future    Right thyroid nodule  -     US Thyroid; Future; Expected date: 11/04/2024        No future appointments.    Jody Ruggiero MD  [facility]  "

## 2024-05-10 DIAGNOSIS — E78.5 DYSLIPIDEMIA: ICD-10-CM

## 2024-05-10 RX ORDER — ROSUVASTATIN CALCIUM 20 MG/1
20 TABLET, COATED ORAL
Qty: 90 TABLET | Refills: 3 | Status: SHIPPED | OUTPATIENT
Start: 2024-05-10

## 2024-05-10 NOTE — TELEPHONE ENCOUNTER
Refill Decision Note   Maya Santos  is requesting a refill authorization.  Brief Assessment and Rationale for Refill:  Approve     Medication Therapy Plan:       Medication Reconciliation Completed: No   Comments:     No Care Gaps recommended.     Note composed:9:10 AM 05/10/2024

## 2024-05-10 NOTE — TELEPHONE ENCOUNTER
No care due was identified.  Health [facility] Embedded Care Due Messages. Reference number: [id number].   5/10/2024 12:41:57 AM CDT

## 2024-05-15 ENCOUNTER — OFFICE VISIT (OUTPATIENT)
Dept: CARDIOLOGY | Facility: CLINIC | Age: 79
End: 2024-05-15
Payer: MEDICARE

## 2024-05-15 VITALS
BODY MASS INDEX: 30.62 KG/M2 | SYSTOLIC BLOOD PRESSURE: 130 MMHG | WEIGHT: 190.5 LBS | HEIGHT: 66 IN | DIASTOLIC BLOOD PRESSURE: 80 MMHG | OXYGEN SATURATION: 95 % | HEART RATE: 74 BPM

## 2024-05-15 DIAGNOSIS — I10 BENIGN ESSENTIAL HYPERTENSION: ICD-10-CM

## 2024-05-15 DIAGNOSIS — E78.00 PRIMARY HYPERCHOLESTEROLEMIA: ICD-10-CM

## 2024-05-15 DIAGNOSIS — I45.4 IVCD (INTRAVENTRICULAR CONDUCTION DEFECT): ICD-10-CM

## 2024-05-15 DIAGNOSIS — I34.0 NONRHEUMATIC MITRAL VALVE REGURGITATION: ICD-10-CM

## 2024-05-15 DIAGNOSIS — I42.0 DILATED CARDIOMYOPATHY: Primary | ICD-10-CM

## 2024-05-15 PROCEDURE — 99999 PR PBB SHADOW E&M-EST. PATIENT-LVL IV: CPT | Mod: PBBFAC,,, | Performed by: INTERNAL MEDICINE

## 2024-05-15 PROCEDURE — 99214 OFFICE O/P EST MOD 30 MIN: CPT | Mod: PBBFAC | Performed by: INTERNAL MEDICINE

## 2024-05-15 PROCEDURE — 99214 OFFICE O/P EST MOD 30 MIN: CPT | Mod: S$PBB,,, | Performed by: INTERNAL MEDICINE

## 2024-05-15 RX ORDER — DAPAGLIFLOZIN 10 MG/1
10 TABLET, FILM COATED ORAL DAILY
Qty: 90 TABLET | Refills: 11 | Status: SHIPPED | OUTPATIENT
Start: 2024-05-15 | End: 2024-05-15 | Stop reason: SDUPTHER

## 2024-05-15 RX ORDER — DAPAGLIFLOZIN 10 MG/1
10 TABLET, FILM COATED ORAL DAILY
Qty: 30 TABLET | Refills: 11 | Status: SHIPPED | OUTPATIENT
Start: 2024-05-15

## 2024-05-15 NOTE — PROGRESS NOTES
Subjective:   Patient ID:  Maya Santos is a 78 y.o. female who presents for follow up of No chief complaint on file.      HPI: Routine yearly f/u but she just had an echo that showed her LV diastolic diameter is 63mm (moderately enlarged) with low normal systolic function, and her ECG showed an IVCD with a QRS duration < 150ms.    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  She is doing well with no new symptoms or cardiovascular complaints and no change in exercise capacity.  She denies chest discomfort, ULLOA, palpitations, PND/orthopnea, lightheadedness and syncope.     Pressures at home are fine.  She was rushing today.        Patient Active Problem List   Diagnosis    Benign essential hypertension    Heart murmur    Mitral valve insufficiency    Multinodular goiter    Primary hypercholesterolemia    Senile osteopenia    Vitamin D deficiency    Dilated cardiomyopathy       Current Outpatient Medications   Medication Sig    irbesartan-hydrochlorothiazide (AVALIDE) 300-12.5 mg per tablet Take 1 tablet by mouth once daily.    omega-3 fatty acids/fish oil (FISH OIL-OMEGA-3 FATTY ACIDS) 300-1,000 mg capsule Take by mouth once daily.    rosuvastatin (CRESTOR) 20 MG tablet TAKE 1 TABLET BY MOUTH EVERY DAY    tirzepatide 7.5 mg/0.5 mL PnIj Inject 7.5 mg into the skin every 7 days.    UNABLE TO FIND Circulation & Vein Support (Patient not taking: Reported on 4/11/2024)    UNABLE TO FIND Essential One Multi vit (Patient not taking: Reported on 4/11/2024)    UNABLE TO FIND medication name:marine collagen (Patient not taking: Reported on 4/11/2024)    vitamin D (VITAMIN D3) 1000 units Tab Take 1,000 Units by mouth once daily.     No current facility-administered medications for this visit.       ROS  The review of systems is negative except as above.     Objective:   Physical Exam  Vitals reviewed.   Constitutional:       Appearance: She is well-developed.   HENT:      Head:  Normocephalic and atraumatic.   Eyes:      General: No scleral icterus.     Conjunctiva/sclera: Conjunctivae normal.   Neck:      Vascular: No JVD.   Cardiovascular:      Rate and Rhythm: Normal rate and regular rhythm.      Pulses: Intact distal pulses.      Heart sounds: Normal heart sounds. No murmur heard.     No friction rub. No gallop.   Pulmonary:      Effort: Pulmonary effort is normal.      Breath sounds: Normal breath sounds. No wheezing or rales.   Abdominal:      General: Bowel sounds are normal. There is no distension.      Palpations: Abdomen is soft.      Tenderness: There is no abdominal tenderness.   Musculoskeletal:         General: Normal range of motion.      Cervical back: Normal range of motion and neck supple.   Skin:     General: Skin is warm and dry.      Findings: No erythema or rash.   Neurological:      Mental Status: She is alert and oriented to person, place, and time.   Psychiatric:         Behavior: Behavior normal.         Thought Content: Thought content normal.         Judgment: Judgment normal.         Lab Results   Component Value Date    WBC 6.70 04/17/2024    HGB 14.2 04/17/2024    HCT 43.0 04/17/2024    MCV 90 04/17/2024     04/17/2024         Chemistry        Component Value Date/Time     04/17/2024 1050    K 4.1 04/17/2024 1050     04/17/2024 1050    CO2 27 04/17/2024 1050    BUN 25 (H) 04/17/2024 1050    CREATININE 0.9 04/17/2024 1050    GLU 86 04/17/2024 1050        Component Value Date/Time    CALCIUM 9.8 04/17/2024 1050    ALKPHOS 64 04/17/2024 1050    AST 21 04/17/2024 1050    ALT 20 04/17/2024 1050    BILITOT 0.9 04/17/2024 1050            Lab Results   Component Value Date    CHOL 133 04/17/2024    CHOL 179 01/04/2023    CHOL 173 08/04/2022     Lab Results   Component Value Date    HDL 34 (L) 04/17/2024    HDL 37 (L) 01/04/2023    HDL 37 (L) 08/04/2022     Lab Results   Component Value Date    LDLCALC 82.6 04/17/2024    LDLCALC 122.4 01/04/2023     LDLCALC 121 08/04/2022     Lab Results   Component Value Date    TRIG 82 04/17/2024    TRIG 98 01/04/2023    TRIG 96 08/04/2022     Lab Results   Component Value Date    CHOLHDL 25.6 04/17/2024    CHOLHDL 20.7 01/04/2023    CHOLHDL 4.68 08/04/2022       Lab Results   Component Value Date    TSH 3.231 04/17/2024       Lab Results   Component Value Date    HGBA1C 5.4 04/17/2024       Assessment:     1. Dilated cardiomyopathy    2. Benign essential hypertension    3. Primary hypercholesterolemia    4. Nonrheumatic mitral valve regurgitation        Plan:     Continue current medicines.  Add Farxiga 10mg daily    Diet/exercise goals reinforced.    F/U 12 months

## 2024-09-30 NOTE — TELEPHONE ENCOUNTER
Care Due:                  Date            Visit Type   Department     Provider  --------------------------------------------------------------------------------                                             [facility]   INTERNAL  Last Visit: 05-      PATIENT      MEDICINE       Jody Couch  Next Visit: None Scheduled  None         None Found                                                            Last  Test          Frequency    Reason                     Performed    Due Date  --------------------------------------------------------------------------------    HBA1C.......  6 months...  tirzepatide..............  04-   10-    [facility] Embedded Care Due Messages. Reference number: [id number].   9/30/2024 3:58:33 PM CDT

## 2024-11-05 ENCOUNTER — OFFICE VISIT (OUTPATIENT)
Dept: CARDIOLOGY | Facility: CLINIC | Age: 79
End: 2024-11-05
Payer: MEDICARE

## 2024-11-05 ENCOUNTER — CLINICAL SUPPORT (OUTPATIENT)
Dept: INTERNAL MEDICINE | Facility: CLINIC | Age: 79
End: 2024-11-05
Payer: MEDICARE

## 2024-11-05 VITALS
WEIGHT: 185.44 LBS | BODY MASS INDEX: 29.8 KG/M2 | OXYGEN SATURATION: 96 % | SYSTOLIC BLOOD PRESSURE: 140 MMHG | HEART RATE: 61 BPM | HEIGHT: 66 IN | DIASTOLIC BLOOD PRESSURE: 80 MMHG

## 2024-11-05 DIAGNOSIS — I42.0 DILATED CARDIOMYOPATHY: Primary | ICD-10-CM

## 2024-11-05 DIAGNOSIS — E78.00 PRIMARY HYPERCHOLESTEROLEMIA: ICD-10-CM

## 2024-11-05 DIAGNOSIS — I10 BENIGN ESSENTIAL HYPERTENSION: ICD-10-CM

## 2024-11-05 DIAGNOSIS — Z23 NEEDS FLU SHOT: Primary | ICD-10-CM

## 2024-11-05 DIAGNOSIS — I45.4 IVCD (INTRAVENTRICULAR CONDUCTION DEFECT): ICD-10-CM

## 2024-11-05 DIAGNOSIS — I34.0 NONRHEUMATIC MITRAL VALVE REGURGITATION: ICD-10-CM

## 2024-11-05 PROCEDURE — 90653 IIV ADJUVANT VACCINE IM: CPT | Mod: PBBFAC

## 2024-11-05 PROCEDURE — 99999PBSHW PR PBB SHADOW TECHNICAL ONLY FILED TO HB: Mod: PBBFAC,,,

## 2024-11-05 PROCEDURE — 99214 OFFICE O/P EST MOD 30 MIN: CPT | Mod: PBBFAC | Performed by: INTERNAL MEDICINE

## 2024-11-05 PROCEDURE — G0008 ADMIN INFLUENZA VIRUS VAC: HCPCS | Mod: PBBFAC

## 2024-11-05 PROCEDURE — 99999 PR PBB SHADOW E&M-EST. PATIENT-LVL IV: CPT | Mod: PBBFAC,,, | Performed by: INTERNAL MEDICINE

## 2024-11-05 RX ADMIN — INFLUENZA A VIRUS A/VICTORIA/4897/2022 IVR-238 (H1N1) ANTIGEN (FORMALDEHYDE INACTIVATED), INFLUENZA A VIRUS A/THAILAND/8/2022 IVR-237 (H3N2) ANTIGEN (FORMALDEHYDE INACTIVATED), INFLUENZA B VIRUS B/AUSTRIA/1359417/2021 BVR-26 ANTIGEN (FORMALDEHYDE INACTIVATED) 0.5 ML: 15; 15; 15 INJECTION, SUSPENSION INTRAMUSCULAR at 04:11

## 2024-11-05 NOTE — PROGRESS NOTES
Subjective:   Patient ID:  Maya Santos is a 78 y.o. female who presents for follow up of No chief complaint on file.      HPI: 6 month f/u.  We started dapagliflozin at the last visit.    She continues to do well with no new symptoms or cardiovascular complaints and no change in exercise capacity.  He denies chest discomfort, ULLOA, palpitations, PND/orthopnea, lightheadedness and syncope.    She exercises twice per week: 15 minutes on the elliptical, then weights and working with a .  No dyspnea or even sweats with this work.    May 2024 HPI: Routine yearly f/u but she just had an echo that showed her LV diastolic diameter is 63mm (moderately enlarged) with low normal systolic function, and her ECG showed an IVCD with a QRS duration < 150ms.       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  She is doing well with no new symptoms or cardiovascular complaints and no change in exercise capacity.  She denies chest discomfort, ULLOA, palpitations, PND/orthopnea, lightheadedness and syncope.     Pressures at home are fine.  She was rushing today.      Patient Active Problem List   Diagnosis    Benign essential hypertension    Heart murmur    Mitral valve insufficiency    Multinodular goiter    Primary hypercholesterolemia    Senile osteopenia    Vitamin D deficiency    Dilated cardiomyopathy    IVCD (intraventricular conduction defect)       Current Outpatient Medications   Medication Sig    dapagliflozin propanediol (FARXIGA) 10 mg tablet Take 1 tablet (10 mg total) by mouth once daily.    irbesartan-hydrochlorothiazide (AVALIDE) 300-12.5 mg per tablet Take 1 tablet by mouth once daily.    omega-3 fatty acids/fish oil (FISH OIL-OMEGA-3 FATTY ACIDS) 300-1,000 mg capsule Take by mouth once daily.    rosuvastatin (CRESTOR) 20 MG tablet TAKE 1 TABLET BY MOUTH EVERY DAY    tirzepatide 7.5 mg/0.5 mL PnIj Inject 7.5 mg into the skin every 7 days.    UNABLE TO FIND Circulation & Vein  Support (Patient not taking: Reported on 4/11/2024)    UNABLE TO FIND Essential One Multi vit (Patient not taking: Reported on 4/11/2024)    UNABLE TO FIND medication name:marine collagen (Patient not taking: Reported on 4/11/2024)    vitamin D (VITAMIN D3) 1000 units Tab Take 1,000 Units by mouth once daily.     No current facility-administered medications for this visit.       ROS  The review of systems is negative except as above.     Objective:   Physical Exam  Vitals reviewed.   Constitutional:       Appearance: She is well-developed.   HENT:      Head: Normocephalic and atraumatic.   Eyes:      General: No scleral icterus.     Conjunctiva/sclera: Conjunctivae normal.   Neck:      Vascular: No JVD.   Cardiovascular:      Rate and Rhythm: Normal rate and regular rhythm.      Pulses: Intact distal pulses.      Heart sounds: Normal heart sounds. No murmur heard.     No friction rub. No gallop.   Pulmonary:      Effort: Pulmonary effort is normal.      Breath sounds: Normal breath sounds. No wheezing or rales.   Abdominal:      General: Bowel sounds are normal. There is no distension.      Palpations: Abdomen is soft.      Tenderness: There is no abdominal tenderness.   Musculoskeletal:         General: Normal range of motion.      Cervical back: Normal range of motion and neck supple.   Skin:     General: Skin is warm and dry.      Findings: No erythema or rash.   Neurological:      Mental Status: She is alert and oriented to person, place, and time.   Psychiatric:         Behavior: Behavior normal.         Thought Content: Thought content normal.         Judgment: Judgment normal.     Lab Results   Component Value Date    WBC 6.70 04/17/2024    HGB 14.2 04/17/2024    HCT 43.0 04/17/2024    MCV 90 04/17/2024     04/17/2024         Chemistry        Component Value Date/Time     04/17/2024 1050    K 4.1 04/17/2024 1050     04/17/2024 1050    CO2 27 04/17/2024 1050    BUN 25 (H) 04/17/2024 1050     CREATININE 0.9 04/17/2024 1050    GLU 86 04/17/2024 1050        Component Value Date/Time    CALCIUM 9.8 04/17/2024 1050    ALKPHOS 64 04/17/2024 1050    AST 21 04/17/2024 1050    ALT 20 04/17/2024 1050    BILITOT 0.9 04/17/2024 1050            Lab Results   Component Value Date    CHOL 133 04/17/2024    CHOL 179 01/04/2023    CHOL 173 08/04/2022     Lab Results   Component Value Date    HDL 34 (L) 04/17/2024    HDL 37 (L) 01/04/2023    HDL 37 (L) 08/04/2022     Lab Results   Component Value Date    LDLCALC 82.6 04/17/2024    LDLCALC 122.4 01/04/2023    LDLCALC 121 08/04/2022     Lab Results   Component Value Date    TRIG 82 04/17/2024    TRIG 98 01/04/2023    TRIG 96 08/04/2022     Lab Results   Component Value Date    CHOLHDL 25.6 04/17/2024    CHOLHDL 20.7 01/04/2023    CHOLHDL 4.68 08/04/2022       Lab Results   Component Value Date    TSH 3.231 04/17/2024       Lab Results   Component Value Date    HGBA1C 5.4 04/17/2024       Assessment:     1. Dilated cardiomyopathy    2. Benign essential hypertension    3. IVCD (intraventricular conduction defect)    4. Nonrheumatic mitral valve regurgitation    5. Primary hypercholesterolemia        Plan:     Given that she has no heart failure, I think it's OK to stop dapagliflozin, especially since she is going to be on tirzepatide anyway.    Diet/exercise goals reinforced.    F/U 12 months

## 2025-02-24 DIAGNOSIS — Z00.00 ENCOUNTER FOR MEDICARE ANNUAL WELLNESS EXAM: ICD-10-CM

## 2025-03-31 ENCOUNTER — PATIENT MESSAGE (OUTPATIENT)
Dept: INTERNAL MEDICINE | Facility: CLINIC | Age: 80
End: 2025-03-31
Payer: MEDICARE

## 2025-04-02 ENCOUNTER — OFFICE VISIT (OUTPATIENT)
Dept: INTERNAL MEDICINE | Facility: CLINIC | Age: 80
End: 2025-04-02
Payer: MEDICARE

## 2025-04-02 VITALS
HEART RATE: 72 BPM | DIASTOLIC BLOOD PRESSURE: 84 MMHG | WEIGHT: 182.75 LBS | SYSTOLIC BLOOD PRESSURE: 140 MMHG | OXYGEN SATURATION: 97 % | BODY MASS INDEX: 29.37 KG/M2 | HEIGHT: 66 IN

## 2025-04-02 DIAGNOSIS — I45.4 IVCD (INTRAVENTRICULAR CONDUCTION DEFECT): ICD-10-CM

## 2025-04-02 DIAGNOSIS — E04.2 MULTINODULAR GOITER: ICD-10-CM

## 2025-04-02 DIAGNOSIS — I34.0 NONRHEUMATIC MITRAL VALVE REGURGITATION: ICD-10-CM

## 2025-04-02 DIAGNOSIS — M85.80 SENILE OSTEOPENIA: ICD-10-CM

## 2025-04-02 DIAGNOSIS — E55.9 VITAMIN D DEFICIENCY: ICD-10-CM

## 2025-04-02 DIAGNOSIS — Z00.00 ENCOUNTER FOR MEDICARE ANNUAL WELLNESS EXAM: Primary | ICD-10-CM

## 2025-04-02 DIAGNOSIS — R01.1 HEART MURMUR: ICD-10-CM

## 2025-04-02 DIAGNOSIS — E78.00 PRIMARY HYPERCHOLESTEROLEMIA: ICD-10-CM

## 2025-04-02 DIAGNOSIS — I42.0 DILATED CARDIOMYOPATHY: ICD-10-CM

## 2025-04-02 DIAGNOSIS — I10 BENIGN ESSENTIAL HYPERTENSION: ICD-10-CM

## 2025-04-02 PROCEDURE — 99215 OFFICE O/P EST HI 40 MIN: CPT | Mod: PBBFAC | Performed by: NURSE PRACTITIONER

## 2025-04-02 PROCEDURE — 99999 PR PBB SHADOW E&M-EST. PATIENT-LVL V: CPT | Mod: PBBFAC,,, | Performed by: NURSE PRACTITIONER

## 2025-04-02 NOTE — PATIENT INSTRUCTIONS
Counseling and Referral of Other Preventative  (Italic type indicates deductible and co-insurance are waived)    Patient Name: Maya Santos  Today's Date: 4/2/2025    Health Maintenance       Date Due Completion Date    Hemoglobin A1c (Prediabetes) 04/17/2025 4/17/2024    DEXA Scan 02/06/2026 2/6/2023    Lipid Panel 04/17/2029 4/17/2024    TETANUS VACCINE 11/23/2032 11/23/2022        No orders of the defined types were placed in this encounter.    The following information is provided to all patients.  This information is to help you find resources for any of the problems found today that may be affecting your health:                  Living healthy guide: www.[facility].[place].gov      Understanding Diabetes: www.diabetes.org      Eating healthy: www.cdc.gov/healthyweight      CDC home safety checklist: www.cdc.gov/steadi/patient.html      Agency on Aging: www.goea.[place].[facility]      Alcoholics anonymous (AA): www.aa.org      Physical Activity: www.gregorio.nih.gov/yr3yhvk      Tobacco use: www.quitwithusla.org

## 2025-04-02 NOTE — PROGRESS NOTES
"  Maya Santos presented for a follow-up Medicare AWV today. The following components were reviewed and updated:    Medical history  Family History  Social history  Allergies and Current Medications  Health Risk Assessment  Health Maintenance  Care Team    **See Completed Assessments for Annual Wellness visit with in the encounter summary    The following assessments were completed:  Depression Screening  Cognitive function Screening      Timed Get Up Test  Whisper Test      Opioid documentation:      Patient does not have a current opioid prescription.          Vitals:    04/02/25 0959   BP: (!) 140/90   BP Location: Left arm   Patient Position: Sitting   Pulse: 72   SpO2: 97%   Weight: 82.9 kg (182 lb 12.2 oz)   Height: 5' 6" (1.676 m)     Body mass index is 29.5 kg/m².       Physical Exam  Vitals reviewed.   Constitutional:       Appearance: She is well-developed.   HENT:      Head: Normocephalic.      Right Ear: External ear normal.      Left Ear: External ear normal.      Nose: Nose normal.      Mouth/Throat:      Pharynx: No oropharyngeal exudate.   Eyes:      Pupils: Pupils are equal, round, and reactive to light.   Neck:      Thyroid: No thyromegaly.      Vascular: No JVD.      Trachea: No tracheal deviation.   Cardiovascular:      Rate and Rhythm: Normal rate and regular rhythm.      Pulses:           Posterior tibial pulses are 1+ on the right side and 1+ on the left side.      Heart sounds: Normal heart sounds. No murmur heard.     No friction rub. No gallop.   Pulmonary:      Effort: Pulmonary effort is normal. No respiratory distress.      Breath sounds: Normal breath sounds. No wheezing or rales.   Abdominal:      General: Bowel sounds are normal. There is no distension.      Palpations: Abdomen is soft.      Tenderness: There is no abdominal tenderness.   Musculoskeletal:         General: No tenderness. Normal range of motion.      Cervical back: Neck supple.   Lymphadenopathy:      Cervical: No " cervical adenopathy.   Skin:     General: Skin is warm and dry.      Findings: No rash.   Neurological:      Mental Status: She is alert and oriented to person, place, and time.   Psychiatric:         Behavior: Behavior normal.            Diagnoses and health risks identified today and associated recommendations/orders:  1. Encounter for Medicare annual wellness exam  All age and gender related screenings discussed   - Referral to Enhanced Annual Wellness Visit (eAWV) W+1    2. Benign essential hypertension  Stable. Will cont irbesartan-HCTZ and low na     3. Dilated cardiomyopathy  Stable will cont irbesartan and f/u with cardiology     4. Heart murmur  Stable will cont irbesartan and f/u with cardiology     5. IVCD (intraventricular conduction defect)  Stable will cont irbesartan and f/u with cardiology     6. Nonrheumatic mitral valve regurgitation  Stable will cont irbesartan and f/u with cardiology     7. Primary hypercholesterolemia  Stable. Will cont crestor and omega 3     8. Multinodular goiter  Stable. Will monitor     9. Vitamin D deficiency  Stable. Will cont vitamin D3    10. Senile osteopenia  Stable. Will cont calcium and vitamin D and monitor Dexa       Provided Maya with a 5-10 year written screening schedule and personal prevention plan. Recommendations were developed using the USPSTF age appropriate recommendations. Education, counseling, and referrals were provided as needed.  After Visit Summary printed and given to patient which includes a list of additional screenings\tests needed.    Post Discharge Follow-up Today:   Future Appointments:  Future Appointments   Date Time Provider Department Center   4/23/2025  9:30 AM NURSE, [facility] MAGAN Villanueva   4/23/2025 10:00 AM Jody Couch MD [facility] JERRY Villanueva         I offered to discuss advanced care planning, including how to pick a person who would make decisions for you if you were unable to make them for yourself,  called a health care power of , and what kind of decisions you might make such as use of life sustaining treatments such as ventilators and tube feeding when faced with a life limiting illness recorded on a living will that they will need to know. (How you want to be cared for as you near the end of your natural life)     X  Patient has advanced directives written and agrees to provide copies to the institution.    Janelle NEWBY, APRN, FNP-c  Nurse Practitioner   Internal Medicine   [address] [place] [address] [id number] [phone]

## 2025-04-15 RX ORDER — TIRZEPATIDE 7.5 MG/.5ML
7.5 INJECTION, SOLUTION SUBCUTANEOUS WEEKLY
Qty: 2 ML | Refills: 5 | Status: SHIPPED | OUTPATIENT
Start: 2025-04-15

## 2025-04-23 ENCOUNTER — HOSPITAL ENCOUNTER (OUTPATIENT)
Dept: CARDIOLOGY | Facility: CLINIC | Age: 80
Discharge: HOME | End: 2025-04-23
Payer: MEDICARE

## 2025-04-23 ENCOUNTER — OFFICE VISIT (OUTPATIENT)
Dept: INTERNAL MEDICINE | Facility: CLINIC | Age: 80
End: 2025-04-23
Payer: MEDICARE

## 2025-04-23 ENCOUNTER — CLINICAL SUPPORT (OUTPATIENT)
Dept: INTERNAL MEDICINE | Facility: CLINIC | Age: 80
End: 2025-04-23
Payer: MEDICARE

## 2025-04-23 DIAGNOSIS — H26.9 CATARACT, UNSPECIFIED CATARACT TYPE, UNSPECIFIED LATERALITY: ICD-10-CM

## 2025-04-23 DIAGNOSIS — Z78.0 ASYMPTOMATIC MENOPAUSE: ICD-10-CM

## 2025-04-23 DIAGNOSIS — R73.03 PREDIABETES: ICD-10-CM

## 2025-04-23 DIAGNOSIS — I10 ESSENTIAL HYPERTENSION: Primary | ICD-10-CM

## 2025-04-23 DIAGNOSIS — E04.2 MULTIPLE THYROID NODULES: ICD-10-CM

## 2025-04-23 DIAGNOSIS — I45.9 INTRAVENTRICULAR CONDUCTION DELAY: ICD-10-CM

## 2025-04-23 DIAGNOSIS — R79.9 ABNORMAL FINDING OF BLOOD CHEMISTRY, UNSPECIFIED: ICD-10-CM

## 2025-04-23 DIAGNOSIS — E55.9 VITAMIN D DEFICIENCY, UNSPECIFIED: ICD-10-CM

## 2025-04-23 DIAGNOSIS — I10 ESSENTIAL HYPERTENSION: ICD-10-CM

## 2025-04-23 DIAGNOSIS — Z00.00 ANNUAL PHYSICAL EXAM: Primary | ICD-10-CM

## 2025-04-23 DIAGNOSIS — R93.3 ABNORMAL FINDINGS ON DIAGNOSTIC IMAGING OF OTHER PARTS OF DIGESTIVE TRACT: ICD-10-CM

## 2025-04-23 DIAGNOSIS — Z12.31 ENCOUNTER FOR SCREENING MAMMOGRAM FOR MALIGNANT NEOPLASM OF BREAST: ICD-10-CM

## 2025-04-23 LAB
25(OH)D3+25(OH)D2 SERPL-MCNC: 39 NG/ML (ref 30–96)
ABSOLUTE EOSINOPHIL (OHS): 0.31 K/UL
ABSOLUTE MONOCYTE (OHS): 0.48 K/UL (ref 0.3–1)
ABSOLUTE NEUTROPHIL COUNT (OHS): 3.53 K/UL (ref 1.8–7.7)
ALBUMIN SERPL BCP-MCNC: 4.1 G/DL (ref 3.5–5.2)
ALP SERPL-CCNC: 67 UNIT/L (ref 40–150)
ALT SERPL W/O P-5'-P-CCNC: 23 UNIT/L (ref 10–44)
ANION GAP (OHS): 7 MMOL/L (ref 8–16)
AST SERPL-CCNC: 27 UNIT/L (ref 11–45)
BACTERIA #/AREA URNS AUTO: ABNORMAL /HPF
BASOPHILS # BLD AUTO: 0.08 K/UL
BASOPHILS NFR BLD AUTO: 1.3 %
BILIRUB SERPL-MCNC: 0.9 MG/DL (ref 0.1–1)
BILIRUB UR QL STRIP.AUTO: NEGATIVE
BUN SERPL-MCNC: 21 MG/DL (ref 8–23)
CALCIUM SERPL-MCNC: 9.7 MG/DL (ref 8.7–10.5)
CHLORIDE SERPL-SCNC: 105 MMOL/L (ref 95–110)
CHOLEST SERPL-MCNC: 145 MG/DL (ref 120–199)
CHOLEST/HDLC SERPL: 3.8 {RATIO} (ref 2–5)
CLARITY UR: CLEAR
CO2 SERPL-SCNC: 27 MMOL/L (ref 23–29)
COLOR UR AUTO: YELLOW
CREAT SERPL-MCNC: 0.9 MG/DL (ref 0.5–1.4)
EAG (OHS): 105 MG/DL (ref 68–131)
ERYTHROCYTE [DISTWIDTH] IN BLOOD BY AUTOMATED COUNT: 12.4 % (ref 11.5–14.5)
GFR SERPLBLD CREATININE-BSD FMLA CKD-EPI: >60 ML/MIN/1.73/M2
GLUCOSE SERPL-MCNC: 89 MG/DL (ref 70–110)
GLUCOSE UR QL STRIP: NEGATIVE
HBA1C MFR BLD: 5.3 % (ref 4–5.6)
HCT VFR BLD AUTO: 42.8 % (ref 37–48.5)
HDLC SERPL-MCNC: 38 MG/DL (ref 40–75)
HDLC SERPL: 26.2 % (ref 20–50)
HGB BLD-MCNC: 13.9 GM/DL (ref 12–16)
HGB UR QL STRIP: NEGATIVE
IMM GRANULOCYTES # BLD AUTO: 0.01 K/UL (ref 0–0.04)
IMM GRANULOCYTES NFR BLD AUTO: 0.2 % (ref 0–0.5)
KETONES UR QL STRIP: NEGATIVE
LDLC SERPL CALC-MCNC: 91.4 MG/DL (ref 63–159)
LEUKOCYTE ESTERASE UR QL STRIP: ABNORMAL
LYMPHOCYTES # BLD AUTO: 1.76 K/UL (ref 1–4.8)
MCH RBC QN AUTO: 29.6 PG (ref 27–31)
MCHC RBC AUTO-ENTMCNC: 32.5 G/DL (ref 32–36)
MCV RBC AUTO: 91 FL (ref 82–98)
MICROSCOPIC COMMENT: ABNORMAL
NITRITE UR QL STRIP: NEGATIVE
NONHDLC SERPL-MCNC: 107 MG/DL
NUCLEATED RBC (/100WBC) (OHS): 0 /100 WBC
OHS QRS DURATION: 134 MS
OHS QTC CALCULATION: 422 MS
PH UR STRIP: 7 [PH]
PLATELET # BLD AUTO: 218 K/UL (ref 150–450)
PMV BLD AUTO: 10.8 FL (ref 9.2–12.9)
POTASSIUM SERPL-SCNC: 4.3 MMOL/L (ref 3.5–5.1)
PROT SERPL-MCNC: 7.4 GM/DL (ref 6–8.4)
PROT UR QL STRIP: NEGATIVE
RBC # BLD AUTO: 4.69 M/UL (ref 4–5.4)
RBC #/AREA URNS AUTO: 1 /HPF (ref 0–4)
RELATIVE EOSINOPHIL (OHS): 5 %
RELATIVE LYMPHOCYTE (OHS): 28.5 % (ref 18–48)
RELATIVE MONOCYTE (OHS): 7.8 % (ref 4–15)
RELATIVE NEUTROPHIL (OHS): 57.2 % (ref 38–73)
SODIUM SERPL-SCNC: 139 MMOL/L (ref 136–145)
SP GR UR STRIP: 1.02
SQUAMOUS #/AREA URNS AUTO: 5 /HPF
TRIGL SERPL-MCNC: 78 MG/DL (ref 30–150)
TSH SERPL-ACNC: 2.45 UIU/ML (ref 0.4–4)
UROBILINOGEN UR STRIP-ACNC: NEGATIVE EU/DL
WBC # BLD AUTO: 6.17 K/UL (ref 3.9–12.7)
WBC #/AREA URNS AUTO: 6 /HPF (ref 0–5)

## 2025-04-23 PROCEDURE — 80053 COMPREHEN METABOLIC PANEL: CPT

## 2025-04-23 PROCEDURE — 93005 ELECTROCARDIOGRAM TRACING: CPT | Mod: PBBFAC | Performed by: INTERNAL MEDICINE

## 2025-04-23 PROCEDURE — 85025 COMPLETE CBC W/AUTO DIFF WBC: CPT

## 2025-04-23 PROCEDURE — 99213 OFFICE O/P EST LOW 20 MIN: CPT | Mod: PBBFAC,25 | Performed by: INTERNAL MEDICINE

## 2025-04-23 PROCEDURE — 83036 HEMOGLOBIN GLYCOSYLATED A1C: CPT

## 2025-04-23 PROCEDURE — 84443 ASSAY THYROID STIM HORMONE: CPT

## 2025-04-23 PROCEDURE — 99999 PR PBB SHADOW E&M-EST. PATIENT-LVL III: CPT | Mod: PBBFAC,,, | Performed by: INTERNAL MEDICINE

## 2025-04-23 PROCEDURE — 93010 ELECTROCARDIOGRAM REPORT: CPT | Mod: S$PBB,,, | Performed by: INTERNAL MEDICINE

## 2025-04-23 PROCEDURE — 80061 LIPID PANEL: CPT

## 2025-04-23 PROCEDURE — 81001 URINALYSIS AUTO W/SCOPE: CPT

## 2025-04-23 PROCEDURE — 82306 VITAMIN D 25 HYDROXY: CPT

## 2025-04-23 PROCEDURE — 99214 OFFICE O/P EST MOD 30 MIN: CPT | Mod: S$PBB,,, | Performed by: INTERNAL MEDICINE

## 2025-04-27 VITALS
SYSTOLIC BLOOD PRESSURE: 138 MMHG | BODY MASS INDEX: 29.53 KG/M2 | OXYGEN SATURATION: 98 % | WEIGHT: 183.75 LBS | HEART RATE: 51 BPM | HEIGHT: 66 IN | DIASTOLIC BLOOD PRESSURE: 80 MMHG

## 2025-04-27 NOTE — PROGRESS NOTES
Subjective:       Patient ID: Maya Santos is a 79 y.o. female who presents today for:    Chief Complaint:   Chief Complaint   Patient presents with    Annual Exam       History of Present Illness    CHIEF COMPLAINT:  Patient presents today for follow up.    CARDIOVASCULAR:  She reports home blood pressure of 126/79 this morning and continues Avalide (Irbesartan with hydrochlorothiazide) for management. She expresses concern about low heart rate of 51, noting EKG from May of previous year showed heart rate of 52. She has premature contractions but denies any associated symptoms.    WEIGHT MANAGEMENT:  She reports gradual weight loss of 17 lbs and desires additional weight reduction. She continues Ozempic injections and recently switched to Lelit Direct, with first injection administered on Saturday.    GERD:  She reports improvement in GERD symptoms, which she attributes to weight loss. She rarely requires antacids for symptom relief.    THYROID:  Thyroid has increased in size over the past year. Thyroid biopsy was performed at [facility].    OCULAR:  She has 20/20 distance vision but requires reading glasses for near vision. Early cataracts have been noted.    MEDICATIONS AND SUPPLEMENTS:  She takes multivitamins (which includes vitamin D), Omega, and circulation and vein support supplements.      ROS:  General: -fever, -chills, -fatigue, -weight gain, -weight loss  Eyes: -vision changes, -redness, -discharge, +wear glasses or contacts  ENT: -ear pain, -nasal congestion, -sore throat  Cardiovascular: -chest pain, -palpitations, -lower extremity edema  Respiratory: -cough, -shortness of breath  Gastrointestinal: -abdominal pain, +nausea, -vomiting, -diarrhea, -constipation, -blood in stool  Genitourinary: -dysuria, -hematuria, -frequency  Musculoskeletal: -joint pain, -muscle pain  Skin: -rash, -lesion  Neurological: -headache, -dizziness, -numbness, -tingling  Psychiatric: -anxiety, -depression, -sleep difficulty  "        ROS     Medications:  Encounter Medications[1]    Allergies:  Review of patient's allergies indicates:  No Known Allergies        Objective:      Vital Signs  Pulse: (!) 51  SpO2: 98 %  BP: (!) 148/80  Patient Position: Sitting  Pain Score: 0-No pain  Height and Weight  Height: 5' 6" (167.6 cm)  Weight: 83.3 kg (183 lb 12.1 oz)  BSA (Calculated - sq m): 1.97 sq meters  BMI (Calculated): 29.7  Weight in (lb) to have BMI = 25: 154.6]    Physical Exam   Physical Exam    Vitals: Blood pressure: 126/79. Heart rate: 51.  General: No acute distress. Well-developed. Well-nourished.  Eyes: EOMI. Sclerae anicteric.  HENT: Normocephalic. Atraumatic.  Cardiovascular: Regular rate. Regular rhythm. No murmurs. No rubs. No gallops. Normal S1, S2.  Respiratory: Normal respiratory effort. Clear to auscultation bilaterally. No rales. No rhonchi. No wheezing.  Abdomen: Soft. Non-tender. Non-distended. Normoactive bowel sounds.  Musculoskeletal: No  obvious deformity.  Extremities: No lower extremity edema.  Neurological: Alert & oriented x3. No slurred speech. Normal gait.  Psychiatric: Normal mood. Normal affect. Good insight. Good judgment.  Skin: Warm. Dry. No rash.        Assessment/plan:     Maya Santos is a 79 y.o.female here for an ANNUAL PHYSICAL EXAM:    Health Maintenance:  Health Maintenance   Topic Date Due    DEXA Scan  02/06/2026    Hemoglobin A1c (Prediabetes)  04/23/2026    Lipid Panel  04/23/2030    TETANUS VACCINE  11/23/2032    Hepatitis C Screening  Completed    Shingles Vaccine  Completed    Influenza Vaccine  Completed    COVID-19 Vaccine  Completed    RSV Vaccine (Age 60+ and Pregnant patients)  Completed    Pneumococcal Vaccines (Age 50+)  Completed        Essential hypertension  -     SCHEDULED EKG 12-LEAD (to Muse); Future    Intraventricular conduction delay  -     SCHEDULED EKG 12-LEAD (to Muse); Future    Multiple thyroid nodules  -     US Thyroid; Future; Expected date: 04/23/2025    Encounter " for screening mammogram for malignant neoplasm of breast  -     Mammo Digital Screening Bilat; Future; Expected date: 04/30/2025    Asymptomatic menopause  -     DXA Bone Density Axial Skeleton 1 or more sites; Future    Cataract, unspecified cataract type, unspecified laterality  -     Ambulatory referral/consult to Optometry; Future; Expected date: 04/30/2025      Assessment & Plan    I10 Essential (primary) hypertension  R00.1 Bradycardia, unspecified  I49.40 Unspecified premature depolarization  K21.9 Gastro-esophageal reflux disease without esophagitis  E04.9 Nontoxic goiter, unspecified  H25.9 Unspecified age-related cataract  H52.4 Presbyopia    IMPRESSION:  - Reviewed EKG from May last year, noting heart rate of 52 and intraventricular conduction delay, explained significance of QRS duration in EKG interpretation.  - Considered annual EKG monitoring due to low heart rate and conduction delay.  - Evaluated bone density results, noting borderline scores and increased 10-year fracture risk, discussed implications of T-scores and fracture risk percentages.  - Considered bisphosphonate treatment as first-line therapy for bone health.  - Assessed lipid profile, noting good LDL levels likely due to weight loss and medication.  - Reviewed A1c, thyroid, and other lab results, all WNL.  - Noted improvement in GERD symptoms, possibly related to weight loss.  - Discussed ongoing use of weight loss medication (Lelit Direct) with Dr. De La Cruz's recommendation, provided information on development of new weight loss medications targeting multiple receptors.    HYPERTENSION:  - Noted the patient's home blood pressure measurement was 126/79 this morning.  - Observed slightly elevated blood pressure in the office, possibly due to white coat effect.  - Acknowledged that the patient's blood pressure is only slightly elevated.  - Noted that digital measurement may be 10 points higher than actual.  - Continued the patient on Avalide  (Irbesartan with hydrochlorothiazide) for blood pressure management.    BRADYCARDIA:  - Recorded the patient's heart rate at 51, which is lower than previous measurements.  - Noted EKG from May last year showed a heart rate of 52.  - Confirmed low heart rate but noted it's not necessarily concerning.  - Verified the patient is not on any medication that would slow heart rate.  - Recommend monitoring the heart rate with annual EKGs.    PREMATURE DEPOLARIZATION AND CONDUCTION DELAY:  - Observed premature contractions and intraventricular conduction delay on EKG.  - Noted QRS duration wider than normal (>100 milliseconds), indicating conduction delay.  - Suggested possibility of using a heart monitor for 2 days or more if needed.  - Recommend annual EKG monitoring.    GASTROESOPHAGEAL REFLUX DISEASE (GERD):  - Noted improvement in GERD symptoms, possibly due to weight loss.  - Observed the patient rarely needs to take antacids now.  - Advised the patient to continue managing symptoms with dietary caution.    THYROID GOITER:  - Ordered thyroid ultrasound.  - Noted increased thyroid size from last year on ultrasound.  - Confirmed thyroid function tests are normal.  - Recorded that the patient had a thyroid biopsy at [facility] previously.    CATARACT AND VISION ISSUES:  - Noted the patient reports having a small cataract.  - Recommend annual eye exams.  - Noted the patient needs reading glasses for near vision.  - Recorded that the patient has 20/20 vision for distance but needs reading glasses (strength 3) for near vision.  - Acknowledged the patient's recent eye exam.    FOLLOW-UP:  - Instructed to follow up with [facility] to schedule colonoscopy.  - Instructed to follow up for annual eye exam.        Future Appointments   Date Time Provider Department Center   5/14/2025  1:30 PM [facility] USOP5 [facility] USOUNDO [facility]   5/14/2025  2:40 PM [facility] DEXA1 [facility] BMD [facility]   5/14/2025  3:30 PM [facility] MAMMO2 BX [facility] MAMMO [facility]   7/10/2025  1:00 PM [facility]     This note was generated with the assistance of ambient listening technology. Verbal consent was obtained by the patient and accompanying visitor(s) for the recording of patient appointment to facilitate this note. I attest to having reviewed and edited the generated note for accuracy, though some syntax or spelling errors may persist. Please contact the author of this note for any clarification.     Jody Ruggiero MD  [facility]       [1]   Outpatient Encounter Medications as of 4/23/2025   Medication Sig Dispense Refill    irbesartan-hydrochlorothiazide (AVALIDE) 300-12.5 mg per tablet Take 1 tablet by mouth once daily. 90 tablet 3    omega-3 fatty acids/fish oil (FISH OIL-OMEGA-3 FATTY ACIDS) 300-1,000 mg capsule Take by mouth once daily.      tirzepatide, weight loss, (ZEPBOUND) 7.5 mg/0.5 mL Soln Inject 7.5 mg into the skin once a week. 2 mL 5    UNABLE TO FIND Circulation & Vein Support      UNABLE TO FIND Essential One Multi vit      rosuvastatin (CRESTOR) 20 MG tablet TAKE 1 TABLET BY MOUTH EVERY DAY 90 tablet 3    [DISCONTINUED] dapagliflozin propanediol (FARXIGA) 10 mg tablet Take 1 tablet (10 mg total) by mouth once daily. (Patient not taking: Reported on 4/23/2025) 30 tablet 11    [DISCONTINUED] tirzepatide 7.5 mg/0.5 mL PnIj Inject 7.5 mg into the skin every 7 days. 4 Pen 3    [DISCONTINUED] UNABLE TO FIND medication name:marine collagen      [DISCONTINUED] vitamin D (VITAMIN D3) 1000 units Tab Take 1,000 Units by mouth once daily.       No facility-administered encounter medications on file as of 4/23/2025.

## 2025-04-28 ENCOUNTER — RESULTS FOLLOW-UP (OUTPATIENT)
Dept: INTERNAL MEDICINE | Facility: CLINIC | Age: 80
End: 2025-04-28

## 2025-05-14 ENCOUNTER — HOSPITAL ENCOUNTER (OUTPATIENT)
Dept: RADIOLOGY | Facility: OTHER | Age: 80
Discharge: HOME | End: 2025-05-14
Attending: INTERNAL MEDICINE
Payer: MEDICARE

## 2025-05-14 DIAGNOSIS — Z78.0 ASYMPTOMATIC MENOPAUSE: ICD-10-CM

## 2025-05-14 DIAGNOSIS — E04.2 MULTIPLE THYROID NODULES: ICD-10-CM

## 2025-05-14 DIAGNOSIS — Z12.31 ENCOUNTER FOR SCREENING MAMMOGRAM FOR MALIGNANT NEOPLASM OF BREAST: ICD-10-CM

## 2025-05-14 PROCEDURE — 77080 DXA BONE DENSITY AXIAL: CPT | Mod: 26,,, | Performed by: RADIOLOGY

## 2025-05-14 PROCEDURE — 77067 SCR MAMMO BI INCL CAD: CPT | Mod: TC

## 2025-05-14 PROCEDURE — 77067 SCR MAMMO BI INCL CAD: CPT | Mod: 26,,, | Performed by: RADIOLOGY

## 2025-05-14 PROCEDURE — 77080 DXA BONE DENSITY AXIAL: CPT | Mod: TC

## 2025-05-14 PROCEDURE — 76536 US EXAM OF HEAD AND NECK: CPT | Mod: 26,,, | Performed by: RADIOLOGY

## 2025-05-14 PROCEDURE — 77063 BREAST TOMOSYNTHESIS BI: CPT | Mod: 26,,, | Performed by: RADIOLOGY

## 2025-05-14 PROCEDURE — 76536 US EXAM OF HEAD AND NECK: CPT | Mod: TC

## 2025-06-17 DIAGNOSIS — M85.80 OSTEOPENIA, UNSPECIFIED LOCATION: Primary | ICD-10-CM

## 2025-06-17 DIAGNOSIS — I10 ESSENTIAL HYPERTENSION: ICD-10-CM

## 2025-06-17 DIAGNOSIS — E78.5 DYSLIPIDEMIA: ICD-10-CM

## 2025-06-17 RX ORDER — ROSUVASTATIN CALCIUM 20 MG/1
20 TABLET, COATED ORAL DAILY
Qty: 90 TABLET | Refills: 3 | Status: SHIPPED | OUTPATIENT
Start: 2025-06-17 | End: 2025-06-18 | Stop reason: SDUPTHER

## 2025-06-17 RX ORDER — IRBESARTAN AND HYDROCHLOROTHIAZIDE 150; 12.5 MG/1; MG/1
1 TABLET, FILM COATED ORAL DAILY
Qty: 90 TABLET | Refills: 1 | Status: SHIPPED | OUTPATIENT
Start: 2025-06-17 | End: 2026-06-17

## 2025-06-17 RX ORDER — ALENDRONATE SODIUM 70 MG/1
70 TABLET ORAL
Qty: 12 TABLET | Refills: 1 | Status: SHIPPED | OUTPATIENT
Start: 2025-06-17 | End: 2026-06-17

## 2025-06-18 DIAGNOSIS — E78.5 DYSLIPIDEMIA: ICD-10-CM

## 2025-06-18 RX ORDER — ROSUVASTATIN CALCIUM 20 MG/1
20 TABLET, COATED ORAL DAILY
Qty: 90 TABLET | Refills: 3 | Status: SHIPPED | OUTPATIENT
Start: 2025-06-18

## 2025-06-18 NOTE — TELEPHONE ENCOUNTER
Care Due:                  Date            Visit Type   Department     Provider  --------------------------------------------------------------------------------                                PHYSICAL -   MADELYN BUSBY                              [facility] INTERNAL  Last Visit: 04-      PATIENT      MEDICINE       Jody Couch  Next Visit: None Scheduled  None         None Found                                                            Last  Test          Frequency    Reason                     Performed    Due Date  --------------------------------------------------------------------------------    Mg Level....  12 months..  alendronate..............  Not Found    Overdue    Phosphate...  12 months..  alendronate..............  Not Found    Overdue    [employer] Embedded Care Due Messages. Reference number: [id number].   6/18/2025 8:45:53 AM CDT